# Patient Record
Sex: MALE | Race: WHITE | NOT HISPANIC OR LATINO | Employment: FULL TIME | URBAN - METROPOLITAN AREA
[De-identification: names, ages, dates, MRNs, and addresses within clinical notes are randomized per-mention and may not be internally consistent; named-entity substitution may affect disease eponyms.]

---

## 2020-12-11 ENCOUNTER — OFFICE VISIT (OUTPATIENT)
Dept: URGENT CARE | Facility: CLINIC | Age: 50
End: 2020-12-11
Payer: COMMERCIAL

## 2020-12-11 VITALS
SYSTOLIC BLOOD PRESSURE: 148 MMHG | HEIGHT: 69 IN | HEART RATE: 82 BPM | DIASTOLIC BLOOD PRESSURE: 80 MMHG | TEMPERATURE: 99.1 F | WEIGHT: 255.4 LBS | BODY MASS INDEX: 37.83 KG/M2 | RESPIRATION RATE: 16 BRPM | OXYGEN SATURATION: 98 %

## 2020-12-11 DIAGNOSIS — M25.562 ACUTE PAIN OF LEFT KNEE: Primary | ICD-10-CM

## 2020-12-11 PROCEDURE — 99213 OFFICE O/P EST LOW 20 MIN: CPT | Performed by: FAMILY MEDICINE

## 2020-12-11 RX ORDER — MELOXICAM 7.5 MG/1
7.5 TABLET ORAL DAILY
Qty: 60 TABLET | Refills: 0 | Status: SHIPPED | OUTPATIENT
Start: 2020-12-11

## 2020-12-11 RX ORDER — IBUPROFEN 200 MG
TABLET ORAL EVERY 6 HOURS PRN
COMMUNITY

## 2022-09-05 ENCOUNTER — OFFICE VISIT (OUTPATIENT)
Dept: URGENT CARE | Facility: CLINIC | Age: 52
End: 2022-09-05
Payer: COMMERCIAL

## 2022-09-05 VITALS
SYSTOLIC BLOOD PRESSURE: 148 MMHG | DIASTOLIC BLOOD PRESSURE: 80 MMHG | HEIGHT: 69 IN | OXYGEN SATURATION: 98 % | TEMPERATURE: 97.9 F | RESPIRATION RATE: 20 BRPM | BODY MASS INDEX: 38.51 KG/M2 | HEART RATE: 131 BPM | WEIGHT: 260 LBS

## 2022-09-05 DIAGNOSIS — M25.562 PAIN AND SWELLING OF LEFT KNEE: Primary | ICD-10-CM

## 2022-09-05 DIAGNOSIS — M25.462 PAIN AND SWELLING OF LEFT KNEE: Primary | ICD-10-CM

## 2022-09-05 PROCEDURE — G0382 LEV 3 HOSP TYPE B ED VISIT: HCPCS | Performed by: PHYSICIAN ASSISTANT

## 2022-09-05 PROCEDURE — S9083 URGENT CARE CENTER GLOBAL: HCPCS | Performed by: PHYSICIAN ASSISTANT

## 2022-09-05 RX ORDER — METHYLPREDNISOLONE 4 MG/1
TABLET ORAL
Qty: 21 TABLET | Refills: 0 | Status: SHIPPED | OUTPATIENT
Start: 2022-09-05

## 2022-09-05 NOTE — PATIENT INSTRUCTIONS
Swollen Knee Joint   AMBULATORY CARE:   A swollen knee joint  may be caused by arthritis or by an injury or trauma, such as a knee sprain  It may also happen if you exercise too much  It may be painful to bend or straighten your knee, or walk  Seek care immediately if:   Your knee locks or gives way and you fall  Your feet or toes start to look pale or feel cold  You cannot bear weight on your leg, or you have severe pain even after treatment  Contact your healthcare provider if:   You have a fever  You have redness or warmth over your knee  The swelling does not decrease with treatment  It gets harder or more painful to straighten your leg at the knee  Your knee weakens, or you continue to limp  You have questions or concerns about your condition or care  Treatment  depends on the cause of your swollen knee joint  Your healthcare provider may recommend any of the following:  NSAIDs , such as ibuprofen, help decrease swelling, pain, and fever  This medicine is available with or without a doctor's order  NSAIDs can cause stomach bleeding or kidney problems in certain people  If you take blood thinner medicine, always ask your healthcare provider if NSAIDs are safe for you  Always read the medicine label and follow directions  Rest your knee  Avoid activities that make the swelling or pain worse  You may need to avoid putting weight on your knee while you have pain  Crutches, a cane, or a walker can be used to avoid putting weight on your knee while it heals  Apply ice to your knee to help relieve pain and swelling  Apply ice for 15 to 20 minutes every hour or as directed  Use an ice pack, or put crushed ice in a plastic bag  Cover it with a towel before you apply it to your knee  Ice helps prevent tissue damage and decreases swelling and pain  Compress your knee with a brace or bandage to help reduce swelling  Use a brace or bandage only as directed      Elevate your knee above the level of your heart as often as you can  This will help decrease swelling and pain  Prop your joint on pillows or blankets to keep it elevated comfortably  Apply heat to your knee to relieve pain  Apply heat for 20 to 30 minutes every 2 hours for as many days as directed  Heat helps decrease pain  Go to physical therapy if directed  A physical therapist teaches you exercises to help improve movement and strength, and to decrease pain  Follow up with your doctor as directed:  Write down your questions so you remember to ask them during your visits  © Copyright [employer] 2022 Information is for End User's use only and may not be sold, redistributed or otherwise used for commercial purposes  All illustrations and images included in CareNotes® are the copyrighted property of A D A M , Inc  or [facility] [employer]   The above information is an  only  It is not intended as medical advice for individual conditions or treatments  Talk to your doctor, nurse or pharmacist before following any medical regimen to see if it is safe and effective for you

## 2022-09-15 ENCOUNTER — APPOINTMENT (OUTPATIENT)
Dept: RADIOLOGY | Facility: CLINIC | Age: 52
End: 2022-09-15
Payer: COMMERCIAL

## 2022-09-15 VITALS
BODY MASS INDEX: 34.75 KG/M2 | DIASTOLIC BLOOD PRESSURE: 78 MMHG | WEIGHT: 234.6 LBS | SYSTOLIC BLOOD PRESSURE: 136 MMHG | HEIGHT: 69 IN

## 2022-09-15 DIAGNOSIS — M25.562 LEFT KNEE PAIN, UNSPECIFIED CHRONICITY: ICD-10-CM

## 2022-09-15 DIAGNOSIS — M25.562 PAIN AND SWELLING OF LEFT KNEE: ICD-10-CM

## 2022-09-15 DIAGNOSIS — M25.562 LEFT KNEE PAIN: ICD-10-CM

## 2022-09-15 DIAGNOSIS — M25.462 PAIN AND SWELLING OF LEFT KNEE: ICD-10-CM

## 2022-09-15 DIAGNOSIS — M25.562 LEFT KNEE PAIN, UNSPECIFIED CHRONICITY: Primary | ICD-10-CM

## 2022-09-15 PROCEDURE — 99204 OFFICE O/P NEW MOD 45 MIN: CPT | Performed by: STUDENT IN AN ORGANIZED HEALTH CARE EDUCATION/TRAINING PROGRAM

## 2022-09-15 PROCEDURE — 73560 X-RAY EXAM OF KNEE 1 OR 2: CPT

## 2022-09-15 PROCEDURE — 20610 DRAIN/INJ JOINT/BURSA W/O US: CPT | Performed by: STUDENT IN AN ORGANIZED HEALTH CARE EDUCATION/TRAINING PROGRAM

## 2022-09-15 PROCEDURE — 73564 X-RAY EXAM KNEE 4 OR MORE: CPT

## 2022-09-15 RX ORDER — BUPIVACAINE HYDROCHLORIDE 2.5 MG/ML
4 INJECTION, SOLUTION INFILTRATION; PERINEURAL
Status: COMPLETED | OUTPATIENT
Start: 2022-09-15 | End: 2022-09-15

## 2022-09-15 RX ORDER — DEXAMETHASONE SODIUM PHOSPHATE 100 MG/10ML
40 INJECTION INTRAMUSCULAR; INTRAVENOUS
Status: COMPLETED | OUTPATIENT
Start: 2022-09-15 | End: 2022-09-15

## 2022-09-15 RX ADMIN — DEXAMETHASONE SODIUM PHOSPHATE 40 MG: 100 INJECTION INTRAMUSCULAR; INTRAVENOUS at 10:01

## 2022-09-15 RX ADMIN — BUPIVACAINE HYDROCHLORIDE 4 ML: 2.5 INJECTION, SOLUTION INFILTRATION; PERINEURAL at 10:01

## 2022-09-15 NOTE — PROGRESS NOTES
Ortho Sports Medicine Knee New Patient Visit      Assesment:   47 y o  male left knee pain, suspected meniscus tears    Plan:    The patient's diagnosis and treatment were discussed at length today  We discussed no treatment, non-operative treatment, and operative treatment  This time offer the patient variety of options including physical therapy, corticosteroid injection, versus MRI of the knee to further evaluate any extended meniscus tears  Primary goal at this time is returning to work  Corticosteroid injection was performed today and home exercise program was instructed  I will see the patient back in 3-4 weeks for re-evaluation  At that time of his having ongoing discomfort will obtain an MRI    Conservative treatment:    Ice to knee for 20 minutes at least 1-2 times daily  Home exercise program for ROM/strengthening to knee, hip and core  Patient denied a formal physical therapy evaluation  Tylenol for pain  Let pain guide gradual return activities  Imaging: All imaging from today was reviewed by myself and explained to the patient  Injection:    The risks and benefits of the injection (which include but are not limited to: infection, bleeding,damage to nerve/artery, need for further intervention), as well as the risks and benefits of all alternative treatments were explained and understood  The patient elected to proceed with injection  The procedure was done with aseptic technique, and the patient tolerated the procedure well with no complications  A corticosteroid injection was performed  Surgery:     No surgery is recommended at this point, continue with conservative treatment plan as noted  Follow up:          Chief Complaint   Patient presents with    Left Knee - Pain       History of Present Illness:     The patient is a 47 y o  male whose occupation is       Knee Surgical History:  None    Past Medical, Social and Family History:  No past medical history on file   No past surgical history on file  No Known Allergies  Current Outpatient Medications on File Prior to Visit   Medication Sig Dispense Refill    ibuprofen (MOTRIN) 200 mg tablet Take by mouth every 6 (six) hours as needed for mild pain      meloxicam (MOBIC) 7 5 mg tablet Take 1 tablet (7 5 mg total) by mouth daily (Patient not taking: Reported on 9/5/2022) 60 tablet 0    methylPREDNISolone 4 MG tablet therapy pack Use as directed on package 21 tablet 0     No current facility-administered medications on file prior to visit  Social History     Socioeconomic History    Marital status: Single     Spouse name: Not on file    Number of children: Not on file    Years of education: Not on file    Highest education level: Not on file   Occupational History    Not on file   Tobacco Use    Smoking status: Never Smoker    Smokeless tobacco: Never Used   Substance and Sexual Activity    Alcohol use: Yes    Drug use: Never    Sexual activity: Not Currently   Other Topics Concern    Not on file   Social History Narrative    Not on file     Social Determinants of Health     Financial Resource Strain: Not on file   Food Insecurity: Not on file   Transportation Needs: Not on file   Physical Activity: Not on file   Stress: Not on file   Social Connections: Not on file   Intimate Partner Violence: Not on file   Housing Stability: Not on file         I have reviewed the past medical, surgical, social and family history, medications and allergies as documented in the EMR  Review of systems: ROS is negative other than that noted in the HPI  Constitutional: Negative for fatigue and fever  HENT: Negative for sore throat  Respiratory: Negative for shortness of breath  Cardiovascular: Negative for chest pain  Gastrointestinal: Negative for abdominal pain  Endocrine: Negative for cold intolerance and heat intolerance  Genitourinary: Negative for flank pain     Musculoskeletal: Negative for back pain    Skin: Negative for rash  Allergic/Immunologic: Negative for immunocompromised state  Neurological: Negative for dizziness  Psychiatric/Behavioral: Negative for agitation  Physical Exam:    Blood pressure 136/78, height 5' 9" (1 753 m), weight 106 kg (234 lb 9 6 oz)  General/Constitutional: NAD, well developed, well nourished  HENT: Normocephalic, atraumatic  CV: Intact distal pulses, regular rate  Resp: No respiratory distress or labored breathing  Lymphatic: No lymphadenopathy palpated  Neuro: Alert and Oriented x 3, no focal deficits  Psych: Normal mood, normal affect, normal judgement, normal behavior  Skin: Warm, dry, no rashes, no erythema      Knee Exam (focused):  Visual inspection of the left knee demonstrates normal contour without atrophy  No previous incisions   There is no significant erythema or edema  No significant joint effusion   Range of motion is full from 5-110 degrees of flexion   Able to straight leg raise   Medial patella tender to palpation  Positive medial joint line tenderness, positive lateral joint line tenderness  Positive medial Ryne's, positive lateral Ryne's  Negative Lachman exam, negative posterior drawer  Negative dial test  Stable to varus and valgus stress at both 0 and 30°  Patella tracks normally  No J sign  No apprehension  Translation is approximately 2 quadrants and is equal to the contralateral side  Patellar eversion is similar to the contralateral side    Examination of the patient's ipsilateral hip demonstrates full painless range of motion  No crepitus  LE NV Exam: +2 DP/PT pulses bilaterally  Sensation intact to light touch L2-S1 bilaterally     Bilateral hip ROM demonstrates no pain actively or passively    No calf tenderness to palpation bilaterally    Knee Imaging    X-rays of the left knee were reviewed, which demonstrate mild degenerative changes under greatest at patellofemoral joint    Medial and lateral joint spaces relatively well maintained  There is a well rounded fabella in the posterior aspect of the knee  No acute osseous injuries  No significant joint effusion  The contralateral right knee has joint space narrowing in the medial compartment     I have reviewed the radiology report and do not currently have a radiology reading from [facility], but will check the result once the reading is performed  Large joint arthrocentesis: L knee  Universal Protocol:  Consent: Verbal consent obtained  Written consent not obtained  Risks and benefits: risks, benefits and alternatives were discussed  Consent given by: patient  Time out: Immediately prior to procedure a "time out" was called to verify the correct patient, procedure, equipment, support staff and site/side marked as required  Timeout called at: 9/15/2022 10:01 AM   Patient understanding: patient states understanding of the procedure being performed  Relevant documents: relevant documents present and verified  Test results: test results available and properly labeled  Site marked: the operative site was marked  Radiology Images displayed and confirmed   If images not available, report reviewed: imaging studies available  Patient identity confirmed: verbally with patient, provided demographic data and hospital-assigned identification number    Supporting Documentation  Indications: pain and joint swelling   Procedure Details  Location: knee - L knee  Preparation: Patient was prepped and draped in the usual sterile fashion  Needle size: 18 G  Ultrasound guidance: no  Approach: anterolateral  Medications administered: 40 mg dexamethasone 100 mg/10 mL; 4 mL bupivacaine 0 25 %    Patient tolerance: patient tolerated the procedure well with no immediate complications  Dressing:  Sterile dressing applied

## 2022-09-15 NOTE — LETTER
September 15, 2022     Patient: Oliver Toro  YOB: 1970  Date of Visit: 9/15/2022      To Whom it May Concern:    Dominik Maurice is under my professional care  Nimesh Celso was seen in my office on 9/15/2022  Nimesh Perezs may return to work on 9/26 with light duty if possible  Avoid any ladders or elevated surfaces  Limit lifting to 25 lbs   If you have any questions or concerns, please don't hesitate to call           Sincerely,          Gustavo Gerber DO        CC: No Recipients

## 2022-09-15 NOTE — LETTER
September 15, 2022     Patient: Truong Chawla  YOB: 1970  Date of Visit: 9/15/2022      To Whom it May Concern:    Radha Xiao is under my professional care  Julio C Louis was seen in my office on 9/15/2022  Bunch Heriberto may return to work with limitations of no ladders or elevated surfaces, no lifting > 25 lbs  If you have any questions or concerns, please don't hesitate to call           Sincerely,          Norma Villarreal, DO        CC: No Recipients

## 2022-09-22 NOTE — PROGRESS NOTES
[facility]        NAME: Reba Schrader is a 47 y o  male  : 1970    MRN: [id number]  DATE: 2022  TIME: 10:41 AM    Assessment and Plan   Pain and swelling of left knee [M25 562, M25 462]  1  Pain and swelling of left knee  methylPREDNISolone 4 MG tablet therapy pack    Ambulatory Referral to Orthopedic Surgery         Patient Instructions       Follow up with PCP in 3-5 days  Proceed to  ER if symptoms worsen  Chief Complaint     Chief Complaint   Patient presents with    Leg Pain     Pt reports left leg and knee pain and swelling for one week  Unsure of any known injury  History of Present Illness       48 yo M presents with left leg and knee pain with swelling that started one week ago  Pt denies any known injury to the area and denies any N/T to the extremity  Review of Systems   Review of Systems   Constitutional: Negative for chills and fever  Respiratory: Negative for chest tightness, shortness of breath and wheezing  Cardiovascular: Negative for chest pain and palpitations  Gastrointestinal: Negative for abdominal pain, constipation, diarrhea, nausea and vomiting  Musculoskeletal: Positive for gait problem, joint swelling and myalgias  Skin: Negative for rash  Neurological: Negative for dizziness, weakness, light-headedness, numbness and headaches  All other systems reviewed and are negative          Current Medications       Current Outpatient Medications:     ibuprofen (MOTRIN) 200 mg tablet, Take by mouth every 6 (six) hours as needed for mild pain, Disp: , Rfl:     methylPREDNISolone 4 MG tablet therapy pack, Use as directed on package, Disp: 21 tablet, Rfl: 0    meloxicam (MOBIC) 7 5 mg tablet, Take 1 tablet (7 5 mg total) by mouth daily (Patient not taking: Reported on 2022), Disp: 60 tablet, Rfl: 0    Current Allergies     Allergies as of 2022    (No Known Allergies)            The following portions of the patient's history were reviewed and updated as appropriate: allergies, current medications, past family history, past medical history, past social history, past surgical history and problem list      History reviewed  No pertinent past medical history  History reviewed  No pertinent surgical history  History reviewed  No pertinent family history  Medications have been verified  Objective   /80   Pulse (!) 131   Temp 97 9 °F (36 6 °C)   Resp 20   Ht 5' 9" (1 753 m)   Wt 118 kg (260 lb)   SpO2 98%   BMI 38 40 kg/m²   No LMP for male patient  Physical Exam     Physical Exam  Vitals and nursing note reviewed  Constitutional:       General: He is not in acute distress  Appearance: He is well-developed  He is not diaphoretic  HENT:      Head: Normocephalic and atraumatic  Cardiovascular:      Pulses: Normal pulses  Pulmonary:      Effort: Pulmonary effort is normal    Musculoskeletal:      Left knee: Swelling present  No erythema, ecchymosis or lacerations  Decreased range of motion  Tenderness present over the medial joint line and lateral joint line  Normal patellar mobility  Skin:     General: Skin is warm and dry  Capillary Refill: Capillary refill takes less than 2 seconds  Neurological:      Mental Status: He is alert and oriented to person, place, and time

## 2022-10-18 ENCOUNTER — HOSPITAL ENCOUNTER (EMERGENCY)
Facility: HOSPITAL | Age: 52
Discharge: HOME | End: 2022-10-18
Attending: EMERGENCY MEDICINE
Payer: COMMERCIAL

## 2022-10-18 ENCOUNTER — APPOINTMENT (EMERGENCY)
Dept: RADIOLOGY | Facility: HOSPITAL | Age: 52
End: 2022-10-18
Payer: COMMERCIAL

## 2022-10-18 VITALS
BODY MASS INDEX: 34.7 KG/M2 | HEART RATE: 93 BPM | DIASTOLIC BLOOD PRESSURE: 70 MMHG | WEIGHT: 235 LBS | OXYGEN SATURATION: 97 % | SYSTOLIC BLOOD PRESSURE: 142 MMHG | TEMPERATURE: 98.7 F | RESPIRATION RATE: 20 BRPM

## 2022-10-18 VITALS — BODY MASS INDEX: 34.66 KG/M2 | HEIGHT: 69 IN | WEIGHT: 234 LBS

## 2022-10-18 DIAGNOSIS — R79.89 ELEVATED D-DIMER: Primary | ICD-10-CM

## 2022-10-18 DIAGNOSIS — G89.29 CHRONIC PAIN OF LEFT KNEE: Primary | ICD-10-CM

## 2022-10-18 DIAGNOSIS — M25.572 LEFT ANKLE PAIN: ICD-10-CM

## 2022-10-18 DIAGNOSIS — M25.472 LEFT ANKLE SWELLING: ICD-10-CM

## 2022-10-18 DIAGNOSIS — M79.662 PAIN OF LEFT CALF: ICD-10-CM

## 2022-10-18 DIAGNOSIS — M25.562 CHRONIC PAIN OF LEFT KNEE: Primary | ICD-10-CM

## 2022-10-18 DIAGNOSIS — R65.10 SIRS (SYSTEMIC INFLAMMATORY RESPONSE SYNDROME) (HCC): ICD-10-CM

## 2022-10-18 LAB
ANION GAP SERPL CALCULATED.3IONS-SCNC: 9 MMOL/L (ref 4–13)
APTT PPP: 29 SECONDS (ref 23–37)
BASOPHILS # BLD AUTO: 0.14 THOUSANDS/ÂΜL (ref 0–0.1)
BASOPHILS NFR BLD AUTO: 1 % (ref 0–1)
BUN SERPL-MCNC: 7 MG/DL (ref 5–25)
CALCIUM SERPL-MCNC: 9.7 MG/DL (ref 8.3–10.1)
CHLORIDE SERPL-SCNC: 99 MMOL/L (ref 96–108)
CO2 SERPL-SCNC: 26 MMOL/L (ref 21–32)
CREAT SERPL-MCNC: 0.69 MG/DL (ref 0.6–1.3)
D DIMER PPP FEU-MCNC: 2.19 UG/ML FEU
EOSINOPHIL # BLD AUTO: 0.12 THOUSAND/ÂΜL (ref 0–0.61)
EOSINOPHIL NFR BLD AUTO: 1 % (ref 0–6)
ERYTHROCYTE [DISTWIDTH] IN BLOOD BY AUTOMATED COUNT: 11.6 % (ref 11.6–15.1)
GFR SERPL CREATININE-BSD FRML MDRD: 108 ML/MIN/1.73SQ M
GLUCOSE SERPL-MCNC: 118 MG/DL (ref 65–140)
HCT VFR BLD AUTO: 38.7 % (ref 36.5–49.3)
HGB BLD-MCNC: 13.5 G/DL (ref 12–17)
IMM GRANULOCYTES # BLD AUTO: 0.09 THOUSAND/UL (ref 0–0.2)
IMM GRANULOCYTES NFR BLD AUTO: 1 % (ref 0–2)
INR PPP: 1.02 (ref 0.84–1.19)
LYMPHOCYTES # BLD AUTO: 2.96 THOUSANDS/ÂΜL (ref 0.6–4.47)
LYMPHOCYTES NFR BLD AUTO: 23 % (ref 14–44)
MCH RBC QN AUTO: 32.4 PG (ref 26.8–34.3)
MCHC RBC AUTO-ENTMCNC: 34.9 G/DL (ref 31.4–37.4)
MCV RBC AUTO: 93 FL (ref 82–98)
MONOCYTES # BLD AUTO: 1.5 THOUSAND/ÂΜL (ref 0.17–1.22)
MONOCYTES NFR BLD AUTO: 11 % (ref 4–12)
NEUTROPHILS # BLD AUTO: 8.3 THOUSANDS/ÂΜL (ref 1.85–7.62)
NEUTS SEG NFR BLD AUTO: 63 % (ref 43–75)
NRBC BLD AUTO-RTO: 0 /100 WBCS
PLATELET # BLD AUTO: 285 THOUSANDS/UL (ref 149–390)
PMV BLD AUTO: 8.4 FL (ref 8.9–12.7)
POTASSIUM SERPL-SCNC: 4.2 MMOL/L (ref 3.5–5.3)
PROTHROMBIN TIME: 14.1 SECONDS (ref 11.6–14.5)
RBC # BLD AUTO: 4.17 MILLION/UL (ref 3.88–5.62)
SODIUM SERPL-SCNC: 134 MMOL/L (ref 135–147)
WBC # BLD AUTO: 13.11 THOUSAND/UL (ref 4.31–10.16)

## 2022-10-18 PROCEDURE — 99284 EMERGENCY DEPT VISIT MOD MDM: CPT

## 2022-10-18 PROCEDURE — 80048 BASIC METABOLIC PNL TOTAL CA: CPT

## 2022-10-18 PROCEDURE — 85610 PROTHROMBIN TIME: CPT

## 2022-10-18 PROCEDURE — 99213 OFFICE O/P EST LOW 20 MIN: CPT | Performed by: STUDENT IN AN ORGANIZED HEALTH CARE EDUCATION/TRAINING PROGRAM

## 2022-10-18 PROCEDURE — 96372 THER/PROPH/DIAG INJ SC/IM: CPT

## 2022-10-18 PROCEDURE — 36415 COLL VENOUS BLD VENIPUNCTURE: CPT

## 2022-10-18 PROCEDURE — 85025 COMPLETE CBC W/AUTO DIFF WBC: CPT

## 2022-10-18 PROCEDURE — 85379 FIBRIN DEGRADATION QUANT: CPT

## 2022-10-18 PROCEDURE — 73610 X-RAY EXAM OF ANKLE: CPT

## 2022-10-18 PROCEDURE — 85730 THROMBOPLASTIN TIME PARTIAL: CPT

## 2022-10-18 RX ORDER — ENOXAPARIN SODIUM 100 MG/ML
30 INJECTION SUBCUTANEOUS ONCE
Status: COMPLETED | OUTPATIENT
Start: 2022-10-18 | End: 2022-10-18

## 2022-10-18 RX ADMIN — ENOXAPARIN SODIUM 30 MG: 100 INJECTION SUBCUTANEOUS at 23:30

## 2022-10-18 NOTE — PROGRESS NOTES
Ortho Sports Medicine Knee Follow Up Visit     Assesment:     47 y o  male left knee suspected meniscus tear, generalized asymmetric left lower extremity edema concerning for DVT    Plan:    The patient's diagnosis and treatment were discussed at length today  We discussed no treatment, non-operative treatment, and operative treatment  Explained that due to his atraumatic onset left lower extremity edema, pain in the posterior aspect of the calf and thigh I am concerned for a DVT  I will send him over to the [place] [address] for stat duplex left lower extremity  If his ultrasound is negative, I advised continued activity modification with rest, elevation, and ice to the left lower extremity  I do feel as internal derangement within the knee, however this is obviously last urgent  We can obtain an MRI within the next several weeks to evaluate for a meniscal tears within the knee  He reports he has continued to work and spends a significant amount of time on his feet, standing, and laboring  He feels is not helping his symptoms, however he is unable to take significant time away from work at the moment  Conservative treatment:    Ice to knee for 20 minutes at least 1-2 times daily  OTC NSAIDS prn for pain  Advised patient to go to [place] [address] ED for workup of possible DVT due to concerning left calf pain and swelling seen on physical exam    Once we clear the patient of potential DVT, we can proceed with MRI of left knee  An MRI will give us a better understanding of any potential internal derangement of the left knee  Imaging: All imaging from today was reviewed by myself and explained to the patient  No imaging was available for review today  Injection:    No Injection planned at this time  Surgery:     No surgery is recommended at this point, continue with conservative treatment plan as noted  Follow up:    No follow-ups on file          Chief Complaint   Patient presents with • Left Knee - Follow-up       History of Present Illness:    Ronel Skelton is a 79-year-old male returns today for evaluation of left knee pain  Since his last visit, he reports some improvement in his left knee pain following the injection  He states injection resolved nearly all of his pain is able to participate in work for 2 weeks time without significant difficulties  He states that the injection has since wore off, and he has ongoing pain both the medial and lateral aspects of the knee  Of note, he reports atraumatic onset of generalized swelling and edema of the left lower leg over the last 1-2 weeks  He does report a fall over the weekend, however his swelling in the leg proceeded this fall  He now has significant bruising over the lower leg in a ring-like pattern around the ankle  He attributes this to wearing his work boots in a excessively tight fashion  He presents seeking diagnosis and treatment recommendations  Knee Surgical History:  None    Past Medical, Social and Family History:  No past medical history on file  No past surgical history on file  No Known Allergies  Current Outpatient Medications on File Prior to Visit   Medication Sig Dispense Refill   • ibuprofen (MOTRIN) 200 mg tablet Take by mouth every 6 (six) hours as needed for mild pain     • meloxicam (MOBIC) 7 5 mg tablet Take 1 tablet (7 5 mg total) by mouth daily (Patient not taking: Reported on 9/5/2022) 60 tablet 0   • methylPREDNISolone 4 MG tablet therapy pack Use as directed on package 21 tablet 0     No current facility-administered medications on file prior to visit       Social History     Socioeconomic History   • Marital status: Single     Spouse name: Not on file   • Number of children: Not on file   • Years of education: Not on file   • Highest education level: Not on file   Occupational History   • Not on file   Tobacco Use   • Smoking status: Never Smoker   • Smokeless tobacco: Never Used   Substance and Sexual Activity   • Alcohol use: Yes   • Drug use: Never   • Sexual activity: Not Currently   Other Topics Concern   • Not on file   Social History Narrative   • Not on file     Social Determinants of Health     Financial Resource Strain: Not on file   Food Insecurity: Not on file   Transportation Needs: Not on file   Physical Activity: Not on file   Stress: Not on file   Social Connections: Not on file   Intimate Partner Violence: Not on file   Housing Stability: Not on file         I have reviewed the past medical, surgical, social and family history, medications and allergies as documented in the EMR  Review of systems: ROS is negative other than that noted in the HPI  Constitutional: Negative for fatigue and fever  Physical Exam:    Height 5' 9" (1 753 m), weight 106 kg (234 lb)  General/Constitutional: NAD, well developed, well nourished  HENT: Normocephalic, atraumatic  CV: Intact distal pulses, regular rate  Resp: No respiratory distress or labored breathing  Lymphatic: No lymphadenopathy palpated  Neuro: Alert and Oriented x 3, no focal deficits  Psych: Normal mood, normal affect, normal judgement, normal behavior  Skin: Warm, dry, no rashes, no erythema      Knee Exam (focused):  Visual inspection of the left leg demonstrates generalized edema from the knee to the ankle with significant edema at the mid tibia and ankle region  Edema does not appear to be pending in nature  There is a bandlike area of ecchymosis and erythema across the ankle  This is mildly blanching  No open wounds or drainage    Mild joint effusion  Range of motion is limited from 5-110 degrees of flexion   Able to straight leg raise   Medial patella tender to palpation  Positive medial joint line tenderness, positive lateral joint line tenderness  Positive medial Ryne's, positive lateral Ryne's  Negative Lachman exam, negative posterior drawer  Negative dial test  Stable to varus and valgus stress at both 0 and 30°  Patella tracks normally  No J sign  No apprehension  Translation is approximately 2 quadrants and is equal to the contralateral side  Patellar eversion is similar to the contralateral side     Examination of the patient's ipsilateral hip demonstrates full painless range of motion  No crepitus  Left Calf:   Erythematous band of distal calf seen  Tender to palpation of generalized left calf  Left calf and ankle extremely swollen    Positive Homans test       LE NV Exam: +2 DP/PT pulses bilaterally  Sensation intact to light touch L2-S1 bilaterally    No calf tenderness to palpation bilaterally      Knee Imaging    No imaging was performed today      Scribe Attestation    I,:   am acting as a scribe while in the presence of the attending physician :       I,:   personally performed the services described in this documentation    as scribed in my presence :

## 2022-10-18 NOTE — LETTER
October 18, 2022     Patient: Laura Moeller  YOB: 1970  Date of Visit: 10/18/2022      To Whom it May Concern:    Yosi Bangfrancesca is under my professional care  Aaron Farah was seen in my office on 10/18/2022  Aaron Farah should limit the amount of time that he is on his feet  He should avoid going on ladders  If you have any questions or concerns, please don't hesitate to call  Sincerely,          Freedom Etienne DO        CC: Gloria Estrada

## 2022-10-18 NOTE — LETTER
October 18, 2022     Patient: Akash Rowe  YOB: 1970  Date of Visit: 10/18/2022      To Whom it May Concern:    Oumou Corado is under my professional care  Jacqueline Campuzano was seen in my office on 10/18/2022  Jacqueline Campuzano should limit the amount of time that he is standing at work  If you have any questions or concerns, please don't hesitate to call  Sincerely,          Katie Muñoz DO        CC: [employer]

## 2022-10-19 ENCOUNTER — TELEPHONE (OUTPATIENT)
Dept: OBGYN CLINIC | Facility: MEDICAL CENTER | Age: 52
End: 2022-10-19

## 2022-10-19 ENCOUNTER — HOSPITAL ENCOUNTER (OUTPATIENT)
Dept: NON INVASIVE DIAGNOSTICS | Facility: HOSPITAL | Age: 52
Discharge: HOME | End: 2022-10-19
Payer: COMMERCIAL

## 2022-10-19 DIAGNOSIS — M79.662 PAIN OF LEFT CALF: ICD-10-CM

## 2022-10-19 DIAGNOSIS — L03.116 CELLULITIS OF LEFT LOWER EXTREMITY: Primary | ICD-10-CM

## 2022-10-19 PROCEDURE — 93971 EXTREMITY STUDY: CPT

## 2022-10-19 PROCEDURE — 93971 EXTREMITY STUDY: CPT | Performed by: SURGERY

## 2022-10-19 RX ORDER — CEPHALEXIN 500 MG/1
500 CAPSULE ORAL EVERY 6 HOURS SCHEDULED
Qty: 28 CAPSULE | Refills: 0 | Status: SHIPPED | OUTPATIENT
Start: 2022-10-19 | End: 2022-10-26

## 2022-10-19 NOTE — TELEPHONE ENCOUNTER
Caller: Patient    Doctor: Keturah Brittle     Reason for call:     Patient calling to let the doctor know that he is scheduled for the [facility] [[id number]] for 10/19/2022 at 4:45 pm today      Call back#: [phone]

## 2022-10-19 NOTE — ED PROVIDER NOTES
History  Chief Complaint   Patient presents with   • Leg Pain     Pt sent by Dr Padilla Lopez for left ankle swelling x2 weeks, noticed circumferential bruising above the ankle yesterday  48 y/o male with PMH meniscal tear presents to the ER today for left ankle swelling x two weeks  Patient states that the swelling started out of nowhere and surrounds his entire ankle  He states yesterday he started with redness around the ankle  Says the pain is a 4/10 and radiates up into his calf  He does admit to falling on the ankle a couple of days ago down the stairs as well  He denies any  Chest pain, shortness of breath, fevers, chills, abdominal pain, vomiting,numbness, tingling, weakness purulent discharge from the area  States he has been having pain with walking on it so he has been using crutches  Has been taking ibuprofen and or tylenol at home with minimal relief  He denies recent hospitalizations, surgeries, travel in plane or long car rides, cancer diagnosis, previous PE/DVT  History provided by:  Patient   used: No    Leg Pain  Pain details:     Quality:  Dull    Severity:  Moderate    Onset quality:  Gradual    Duration:  2 weeks    Timing:  Constant    Progression:  Waxing and waning  Chronicity:  New  Ineffective treatments:  Acetaminophen and NSAIDs  Associated symptoms: decreased ROM and swelling    Associated symptoms: no fever, no numbness and no tingling        Prior to Admission Medications   Prescriptions Last Dose Informant Patient Reported? Taking?   ibuprofen (MOTRIN) 200 mg tablet   Yes No   Sig: Take by mouth every 6 (six) hours as needed for mild pain   meloxicam (MOBIC) 7 5 mg tablet   No No   Sig: Take 1 tablet (7 5 mg total) by mouth daily   Patient not taking: Reported on 9/5/2022   methylPREDNISolone 4 MG tablet therapy pack   No No   Sig: Use as directed on package      Facility-Administered Medications: None       History reviewed   No pertinent past medical history  History reviewed  No pertinent surgical history  History reviewed  No pertinent family history  I have reviewed and agree with the history as documented  E-Cigarette/Vaping     E-Cigarette/Vaping Substances     Social History     Tobacco Use   • Smoking status: Never Smoker   • Smokeless tobacco: Never Used   Substance Use Topics   • Alcohol use: Yes   • Drug use: Never       Review of Systems   Constitutional: Negative for chills and fever  Respiratory: Negative for chest tightness and shortness of breath  Cardiovascular: Positive for leg swelling  Negative for chest pain and palpitations  Gastrointestinal: Negative for abdominal pain, nausea and vomiting  Musculoskeletal: Positive for arthralgias and joint swelling  Skin: Negative for color change  Neurological: Negative for weakness, numbness and headaches  Psychiatric/Behavioral: Negative for behavioral problems and sleep disturbance  All other systems reviewed and are negative  Physical Exam  Physical Exam  Vitals and nursing note reviewed  Constitutional:       General: He is awake  Appearance: Normal appearance  He is well-developed  HENT:      Head: Normocephalic and atraumatic  Right Ear: External ear normal       Left Ear: External ear normal       Nose: Nose normal    Eyes:      General: No scleral icterus  Extraocular Movements: Extraocular movements intact  Cardiovascular:      Rate and Rhythm: Normal rate and regular rhythm  Heart sounds: Normal heart sounds, S1 normal and S2 normal  No murmur heard  No gallop  Pulmonary:      Effort: Pulmonary effort is normal       Breath sounds: Normal breath sounds  No wheezing, rhonchi or rales  Musculoskeletal:         General: Normal range of motion  Cervical back: Normal range of motion  Comments: Patient is tender to palpation of the medial and lateral malleoli   There is associated swelling around the ankle joint with erythema noted above the ankle on the calf  Patient also tender to palpation of the distal lateral calf  Decreased flexion ability, full strength noted and otherwise ROM normal  Area is not warm to the touch, no crepitus noted  Soft compartments  NV and sensation intact  DP and PT pulses +2  Cap refill 2 sec  See photo attached below   Skin:     General: Skin is warm and dry  Findings: Erythema present  Comments: Erythema noted above the ankle circumferentially  See photos   Neurological:      General: No focal deficit present  Mental Status: He is alert  Psychiatric:         Attention and Perception: Attention and perception normal          Mood and Affect: Mood normal          Behavior: Behavior normal  Behavior is cooperative  Vital Signs  ED Triage Vitals [10/18/22 1848]   Temperature Pulse Respirations Blood Pressure SpO2   98 7 °F (37 1 °C) 105 18 (!) 172/101 95 %      Temp Source Heart Rate Source Patient Position - Orthostatic VS BP Location FiO2 (%)   Oral Monitor Sitting Left arm --      Pain Score       --           Vitals:    10/18/22 1848 10/18/22 2057 10/18/22 2209 10/18/22 2300   BP: (!) 172/101 145/73 132/73 142/70   Pulse: 105 98 98 93   Patient Position - Orthostatic VS: Sitting  Lying          Visual Acuity      ED Medications  Medications   enoxaparin (LOVENOX) subcutaneous injection 30 mg (30 mg Subcutaneous Given 10/18/22 2330)       Diagnostic Studies  Results Reviewed     Procedure Component Value Units Date/Time    D-Dimer [[id number]]  (Abnormal) Collected: 10/18/22 2208    Lab Status: Final result Specimen: Blood from Arm, Left Updated: 10/18/22 2245     D-Dimer, Quant 2 19 ug/ml FEU     Narrative:       In the evaluation for possible pulmonary embolism, in the appropriate (Well's Score of 4 or less) patient, the age adjusted d-dimer cutoff for this patient can be calculated as:    Age x 0 01 (in ug/mL) for Age-adjusted D-dimer exclusion threshold for a patient over 50 years      Protime-INR [[id number]]  (Normal) Collected: 10/18/22 2208    Lab Status: Final result Specimen: Blood from Arm, Left Updated: 10/18/22 2230     Protime 14 1 seconds      INR 1 02    APTT [[id number]]  (Normal) Collected: 10/18/22 2208    Lab Status: Final result Specimen: Blood from Arm, Left Updated: 10/18/22 2230     PTT 29 seconds     Basic metabolic panel [[id number]]  (Abnormal) Collected: 10/18/22 2208    Lab Status: Final result Specimen: Blood from Arm, Left Updated: 10/18/22 2225     Sodium 134 mmol/L      Potassium 4 2 mmol/L      Chloride 99 mmol/L      CO2 26 mmol/L      ANION GAP 9 mmol/L      BUN 7 mg/dL      Creatinine 0 69 mg/dL      Glucose 118 mg/dL      Calcium 9 7 mg/dL      eGFR 108 ml/min/1 73sq m     Narrative:      [place] guidelines for Chronic Kidney Disease (CKD):   •  Stage 1 with normal or high GFR (GFR > 90 mL/min/1 73 square meters)  •  Stage 2 Mild CKD (GFR = 60-89 mL/min/1 73 square meters)  •  Stage 3A Moderate CKD (GFR = 45-59 mL/min/1 73 square meters)  •  Stage 3B Moderate CKD (GFR = 30-44 mL/min/1 73 square meters)  •  Stage 4 Severe CKD (GFR = 15-29 mL/min/1 73 square meters)  •  Stage 5 End Stage CKD (GFR <15 mL/min/1 73 square meters)  Note: GFR calculation is accurate only with a steady state creatinine    CBC and differential [[id number]]  (Abnormal) Collected: 10/18/22 2208    Lab Status: Final result Specimen: Blood from Arm, Left Updated: 10/18/22 2217     WBC 13 11 Thousand/uL      RBC 4 17 Million/uL      Hemoglobin 13 5 g/dL      Hematocrit 38 7 %      MCV 93 fL      MCH 32 4 pg      MCHC 34 9 g/dL      RDW 11 6 %      MPV 8 4 fL      Platelets 021 Thousands/uL      nRBC 0 /100 WBCs      Neutrophils Relative 63 %      Immat GRANS % 1 %      Lymphocytes Relative 23 %      Monocytes Relative 11 %      Eosinophils Relative 1 %      Basophils Relative 1 %      Neutrophils Absolute 8 30 Thousands/µL      Immature Grans Absolute 0 09 Thousand/uL      Lymphocytes Absolute 2 96 Thousands/µL      Monocytes Absolute 1 50 Thousand/µL      Eosinophils Absolute 0 12 Thousand/µL      Basophils Absolute 0 14 Thousands/µL                  XR ankle 3+ views LEFT    (Results Pending)              Procedures  Procedures         ED Course  ED Course as of 10/19/22 0110   Tue Oct 18, 2022   2233 Discussed case with Dr Davon Avelar, will wait to see what the D-dimer is and if negative can treat for cellulitis  Septic workup not required at this time  If d-dimer elevated will treat for DVT and order DVT study for the morning    2326 D-dimer elevated so will treat with lovenox and inform patient he needs to get the DVT study in the morning    2327 Elevated WBC, tachycardia likely due to DVT so sepsis workup not indicated                            Initial Sepsis Screening     Row Name 10/18/22 2326 10/18/22 2227             Is the patient's history suggestive of a new or worsening infection? No  -LM --       Suspected source of infection -- --       Are two or more of the following signs & symptoms of infection both present and new to the patient? Yes (Proceed)  -LM --       Indicate SIRS criteria Leukocytosis (WBC > 53401 IJL); Tachycardia > 90 bpm  -LM Tachycardia > 90 bpm;Leukocytosis (WBC > 36555 IJL)  -LM       If the answer is yes to both questions, suspicion of sepsis is present -- --       If severe sepsis is present AND tissue hypoperfusion perists in the hour after fluid resuscitation or lactate > 4, the patient meets criteria for SEPTIC SHOCK -- --       Are any of the following organ dysfunction criteria present within 6 hours of suspected infection and SIRS criteria that are NOT considered to be chronic conditions? -- --       Organ dysfunction -- --       Date of presentation of severe sepsis -- --       Time of presentation of severe sepsis -- --       Tissue hypoperfusion persists in the hour after crystalloid fluid administration, evidenced, by either: -- --       Was hypotension present within one hour of the conclusion of crystalloid fluid administration? -- --       Date of presentation of septic shock -- --       Time of presentation of septic shock -- --             User Key  (r) = Recorded By, (t) = Taken By, (c) = Cosigned By    [address] Name Provider Type    GIFTY Carl PA-C Physician Assistant                SBIRT 22yo+    Flowsheet Row Most Recent Value   SBIRT (23 yo +)    In order to provide better care to our patients, we are screening all of our patients for alcohol and drug use  Would it be okay to ask you these screening questions? Yes Filed at: 10/18/2022 2211   Initial Alcohol Screen: US AUDIT-C     1  How often do you have a drink containing alcohol? 6 Filed at: 10/18/2022 2211   2  How many drinks containing alcohol do you have on a typical day you are drinking? 6 Filed at: 10/18/2022 2211   3a  Male UNDER 65: How often do you have five or more drinks on one occasion? 6 Filed at: 10/18/2022 2211   Audit-C Score 18 Filed at: 10/18/2022 2211   Full Alcohol Screen: US AUDIT    4  How often during the last year have you found that you were not able to stop drinking once you had started? 0 Filed at: 10/18/2022 2211   5  How often during past year have you failed to do what was normally expected of you because of drinking? 0 Filed at: 10/18/2022 2211   6  How often in past year have you needed a first drink in the morning to get yourself going after a heavy drinking session? 0 Filed at: 10/18/2022 2211   7  How often in past year have you had feeling of guilt or remorse after drinking? 0 Filed at: 10/18/2022 2211   8  How often in past year have you been unable to remember what happened night before because you had been drinking? 0 Filed at: 10/18/2022 2211   9  Have you or someone else been injured as a result of your drinking? 0 Filed at: 10/18/2022 2211   10   Has a relative, friend, doctor or other health worker been concerned about your drinking and suggested you cut down?  0 Filed at: 10/18/2022 2211   AUDIT Total Score 18 Filed at: 10/18/2022 2211   JOAN: How many times in the past year have you    Used an illegal drug or used a prescription medication for non-medical reasons? Never Filed at: 10/18/2022 2211            Marcelino' Criteria for DVT    Flowsheet Row Most Recent Value   Marcelino' Criteria for DVT    Active cancer Treatment or palliation within 6 months 0 Filed at: 10/18/2022 2145   Bedridden recently >3 days or major surgery within 12 weeks 0 Filed at: 10/18/2022 2145   Calf swelling >3 cm compared to the other leg 0 Filed at: 10/18/2022 2145   Entire leg swollen 0 Filed at: 10/18/2022 2145   Collateral (nonvaricose) superficial veins present 0 Filed at: 10/18/2022 2145   Localized tenderness along the deep venous system 0 Filed at: 10/18/2022 2145   Pitting edema, confined to symptomatic leg 1 Filed at: 10/18/2022 2145   Paralysis, paresis, or recent plaster immobilization of the lower extremity 0 Filed at: 10/18/2022 2145   Previously documented DVT 0 Filed at: 10/18/2022 2145   Alternative diagnosis to DVT as likely or more likely 0 Filed at: 10/18/2022 2145   Marcelino DVT Critera Score 1 Filed at: 10/18/2022 2145              MDM  Number of Diagnoses or Management Options  Elevated d-dimer: new and requires workup  Left ankle pain: new and requires workup  Left ankle swelling: new and requires workup  SIRS (systemic inflammatory response syndrome) ([facility] [address] ): new and does not require workup  Diagnosis management comments: 46 y/o male sent by his orthopedic doctor for left ankle swelling x 2 weeks and redness started yesterday  Differential diagnosis: DVT, ankle fracture, cellulitis, septic arthritis, osteoarthritis  Assessment[de-identified] elevated D-dimer with left ankle pain and swelling  Plan: patient met SIRS criteria due to tachycardia and elevated WBC but cause not likely to be infectious, likely DVT due to elevated d-dimer  Patient started on lovenox and told to schedule DVT study tomorrow morning with order from orthopedic doctor  Patient was informed that the medication would only last him until tomorrow so he should call his PCP for follow-up after the DVT study  Patient was given info and care instructions about DVTs  He  was given strict return to ER precautions both verbally and in discharge papers  Patient verbalized understanding and agrees with plan  Amount and/or Complexity of Data Reviewed  Clinical lab tests: ordered and reviewed  Tests in the radiology section of CPT®: ordered and reviewed    Risk of Complications, Morbidity, and/or Mortality  Presenting problems: moderate  Diagnostic procedures: low  Management options: moderate    Patient Progress  Patient progress: stable      Disposition  Final diagnoses:   Elevated d-dimer   SIRS (systemic inflammatory response syndrome) ([facility] )   Left ankle pain   Left ankle swelling     Time reflects when diagnosis was documented in both MDM as applicable and the Disposition within this note     Time User Action Codes Description Comment    10/18/2022 11:26 PM Twila Gauze Add [R79 89] Elevated d-dimer     10/18/2022 11:27 PM Twila Gauze Add [R65 10] SIRS (systemic inflammatory response syndrome) ([facility] )     10/18/2022 11:27 PM Twila Gauze Add [M25 572] Left ankle pain     10/18/2022 11:27 PM Twila Gauze Add [M25 472] Left ankle swelling       ED Disposition     ED Disposition   Discharge    Condition   Stable    Date/Time   Tue Oct 18, 2022 11:26 PM    Comment   [place] LENZ [facility] discharge to home/self care                 Follow-up Information     Follow up With Specialties Details Why Contact Info Additional Information    Latha Oropeza MD Family Medicine Schedule an appointment as soon as possible for a visit   [place]  [address]  [address] [address] [address] [facility],[address]        [address] Emergency Department Emergency Medicine Go to  if you develop intense pain in your foot or calf that is worse or different than before, redness spreads, develop fevers, chest pain, shortness of breath, cough up blood, difficulty breathing [address] [facility] Emergency Department, [address] [place], [address]          Discharge Medication List as of 10/18/2022 11:29 PM      CONTINUE these medications which have NOT CHANGED    Details   ibuprofen (MOTRIN) 200 mg tablet Take by mouth every 6 (six) hours as needed for mild pain, Historical Med      meloxicam (MOBIC) 7 5 mg tablet Take 1 tablet (7 5 mg total) by mouth daily, Starting Fri 12/11/2020, Normal      methylPREDNISolone 4 MG tablet therapy pack Use as directed on package, Normal             No discharge procedures on file      PDMP Review     None          ED Provider  Electronically Signed by           Elizabeth Hatfield PA-C  10/19/22 0113

## 2022-10-19 NOTE — DISCHARGE INSTRUCTIONS
Call the central scheduling number on the order for DVT study from the orthopedic doctor to schedule the DVT study for tomorrow  The medication with cover you only until tomorrow so make sure you get the study done     Schedule an appointment with your primary care doctor for follow up evaluation   Return to the ER if you develop intense pain in your foot or calf that is worse or different than before, redness spreads, develop fevers, chest pain, shortness of breath, cough up blood, difficulty breathing

## 2022-10-20 ENCOUNTER — RA CDI HCC (OUTPATIENT)
Dept: OTHER | Facility: HOSPITAL | Age: 52
End: 2022-10-20

## 2022-10-20 NOTE — PROGRESS NOTES
[facility]  coding opportunities       Chart reviewed, no opportunity found: CHART REVIEWED, NO OPPORTUNITY FOUND     Patients Insurance     Commercial Insurance: [address] [place] [address]

## 2022-11-05 ENCOUNTER — HOSPITAL ENCOUNTER (OUTPATIENT)
Dept: MRI IMAGING | Facility: HOSPITAL | Age: 52
Discharge: HOME | End: 2022-11-05

## 2022-11-05 DIAGNOSIS — G89.29 CHRONIC PAIN OF LEFT KNEE: ICD-10-CM

## 2022-11-05 DIAGNOSIS — M25.562 CHRONIC PAIN OF LEFT KNEE: ICD-10-CM

## 2025-04-25 ENCOUNTER — APPOINTMENT (EMERGENCY)
Dept: RADIOLOGY | Facility: HOSPITAL | Age: 55
End: 2025-04-25
Payer: COMMERCIAL

## 2025-04-25 ENCOUNTER — OFFICE VISIT (OUTPATIENT)
Dept: URGENT CARE | Facility: CLINIC | Age: 55
End: 2025-04-25
Payer: COMMERCIAL

## 2025-04-25 ENCOUNTER — HOSPITAL ENCOUNTER (EMERGENCY)
Facility: HOSPITAL | Age: 55
Discharge: HOME | End: 2025-04-25
Attending: EMERGENCY MEDICINE
Payer: COMMERCIAL

## 2025-04-25 VITALS
SYSTOLIC BLOOD PRESSURE: 107 MMHG | HEIGHT: 69 IN | RESPIRATION RATE: 20 BRPM | WEIGHT: 245 LBS | TEMPERATURE: 98.7 F | DIASTOLIC BLOOD PRESSURE: 76 MMHG | OXYGEN SATURATION: 95 % | HEART RATE: 80 BPM | BODY MASS INDEX: 36.29 KG/M2

## 2025-04-25 VITALS — HEART RATE: 145 BPM | OXYGEN SATURATION: 98 % | RESPIRATION RATE: 24 BRPM | TEMPERATURE: 102 F

## 2025-04-25 DIAGNOSIS — I83.812 VARICOSE VEINS OF LEFT LOWER EXTREMITY WITH PAIN: ICD-10-CM

## 2025-04-25 DIAGNOSIS — M79.89 LEG SWELLING: Primary | ICD-10-CM

## 2025-04-25 DIAGNOSIS — L03.119 CELLULITIS OF FOOT: ICD-10-CM

## 2025-04-25 DIAGNOSIS — S83.90XA KNEE SPRAIN: ICD-10-CM

## 2025-04-25 DIAGNOSIS — M79.662 PAIN OF LEFT CALF: Primary | ICD-10-CM

## 2025-04-25 DIAGNOSIS — M25.562 ACUTE PAIN OF LEFT KNEE: ICD-10-CM

## 2025-04-25 LAB
ALBUMIN SERPL BCG-MCNC: 4.3 G/DL (ref 3.5–5)
ALP SERPL-CCNC: 96 U/L (ref 34–104)
ALT SERPL W P-5'-P-CCNC: 49 U/L (ref 7–52)
ANION GAP SERPL CALCULATED.3IONS-SCNC: 13 MMOL/L (ref 4–13)
AST SERPL W P-5'-P-CCNC: 65 U/L (ref 13–39)
ATRIAL RATE: 97 BPM
BASOPHILS # BLD AUTO: 0.12 THOUSANDS/ÂΜL (ref 0–0.1)
BASOPHILS NFR BLD AUTO: 1 % (ref 0–1)
BILIRUB SERPL-MCNC: 0.58 MG/DL (ref 0.2–1)
BUN SERPL-MCNC: 10 MG/DL (ref 5–25)
CALCIUM SERPL-MCNC: 9.9 MG/DL (ref 8.4–10.2)
CHLORIDE SERPL-SCNC: 98 MMOL/L (ref 96–108)
CO2 SERPL-SCNC: 21 MMOL/L (ref 21–32)
CREAT SERPL-MCNC: 0.69 MG/DL (ref 0.6–1.3)
D DIMER PPP FEU-MCNC: 1.09 UG/ML FEU
EOSINOPHIL # BLD AUTO: 0.23 THOUSAND/ÂΜL (ref 0–0.61)
EOSINOPHIL NFR BLD AUTO: 2 % (ref 0–6)
ERYTHROCYTE [DISTWIDTH] IN BLOOD BY AUTOMATED COUNT: 11.1 % (ref 11.6–15.1)
GFR SERPL CREATININE-BSD FRML MDRD: 106 ML/MIN/1.73SQ M
GLUCOSE SERPL-MCNC: 126 MG/DL (ref 65–140)
HCT VFR BLD AUTO: 45 % (ref 36.5–49.3)
HGB BLD-MCNC: 15.4 G/DL (ref 12–17)
IMM GRANULOCYTES # BLD AUTO: 0.12 THOUSAND/UL (ref 0–0.2)
IMM GRANULOCYTES NFR BLD AUTO: 1 % (ref 0–2)
LYMPHOCYTES # BLD AUTO: 3.58 THOUSANDS/ÂΜL (ref 0.6–4.47)
LYMPHOCYTES NFR BLD AUTO: 26 % (ref 14–44)
MCH RBC QN AUTO: 31.6 PG (ref 26.8–34.3)
MCHC RBC AUTO-ENTMCNC: 34.2 G/DL (ref 31.4–37.4)
MCV RBC AUTO: 92 FL (ref 82–98)
MONOCYTES # BLD AUTO: 1.51 THOUSAND/ÂΜL (ref 0.17–1.22)
MONOCYTES NFR BLD AUTO: 11 % (ref 4–12)
NEUTROPHILS # BLD AUTO: 8.09 THOUSANDS/ÂΜL (ref 1.85–7.62)
NEUTS SEG NFR BLD AUTO: 59 % (ref 43–75)
NRBC BLD AUTO-RTO: 0 /100 WBCS
P AXIS: 50 DEGREES
PLATELET # BLD AUTO: 311 THOUSANDS/UL (ref 149–390)
PMV BLD AUTO: 8.5 FL (ref 8.9–12.7)
POTASSIUM SERPL-SCNC: 3.8 MMOL/L (ref 3.5–5.3)
PR INTERVAL: 190 MS
PROT SERPL-MCNC: 9.2 G/DL (ref 6.4–8.4)
QRS AXIS: -16 DEGREES
QRSD INTERVAL: 88 MS
QT INTERVAL: 342 MS
QTC INTERVAL: 434 MS
RBC # BLD AUTO: 4.87 MILLION/UL (ref 3.88–5.62)
SODIUM SERPL-SCNC: 132 MMOL/L (ref 135–147)
T WAVE AXIS: 27 DEGREES
VENTRICULAR RATE: 97 BPM
WBC # BLD AUTO: 13.65 THOUSAND/UL (ref 4.31–10.16)

## 2025-04-25 PROCEDURE — 96372 THER/PROPH/DIAG INJ SC/IM: CPT

## 2025-04-25 PROCEDURE — 73630 X-RAY EXAM OF FOOT: CPT

## 2025-04-25 PROCEDURE — 85379 FIBRIN DEGRADATION QUANT: CPT

## 2025-04-25 PROCEDURE — S9083 URGENT CARE CENTER GLOBAL: HCPCS | Performed by: FAMILY MEDICINE

## 2025-04-25 PROCEDURE — 93005 ELECTROCARDIOGRAM TRACING: CPT

## 2025-04-25 PROCEDURE — 96365 THER/PROPH/DIAG IV INF INIT: CPT

## 2025-04-25 PROCEDURE — 73564 X-RAY EXAM KNEE 4 OR MORE: CPT

## 2025-04-25 PROCEDURE — 36415 COLL VENOUS BLD VENIPUNCTURE: CPT

## 2025-04-25 PROCEDURE — 96375 TX/PRO/DX INJ NEW DRUG ADDON: CPT

## 2025-04-25 PROCEDURE — 99283 EMERGENCY DEPT VISIT LOW MDM: CPT

## 2025-04-25 PROCEDURE — 99285 EMERGENCY DEPT VISIT HI MDM: CPT | Performed by: EMERGENCY MEDICINE

## 2025-04-25 PROCEDURE — 96361 HYDRATE IV INFUSION ADD-ON: CPT

## 2025-04-25 PROCEDURE — 85025 COMPLETE CBC W/AUTO DIFF WBC: CPT

## 2025-04-25 PROCEDURE — 80053 COMPREHEN METABOLIC PANEL: CPT

## 2025-04-25 PROCEDURE — G0382 LEV 3 HOSP TYPE B ED VISIT: HCPCS | Performed by: FAMILY MEDICINE

## 2025-04-25 RX ORDER — SULFAMETHOXAZOLE AND TRIMETHOPRIM 800; 160 MG/1; MG/1
1 TABLET ORAL 2 TIMES DAILY
Qty: 14 TABLET | Refills: 0 | Status: SHIPPED | OUTPATIENT
Start: 2025-04-25 | End: 2025-05-02

## 2025-04-25 RX ORDER — ENOXAPARIN SODIUM 150 MG/ML
1 INJECTION SUBCUTANEOUS ONCE
Status: COMPLETED | OUTPATIENT
Start: 2025-04-25 | End: 2025-04-25

## 2025-04-25 RX ORDER — KETOROLAC TROMETHAMINE 30 MG/ML
15 INJECTION, SOLUTION INTRAMUSCULAR; INTRAVENOUS ONCE
Status: COMPLETED | OUTPATIENT
Start: 2025-04-25 | End: 2025-04-25

## 2025-04-25 RX ORDER — ACETAMINOPHEN 325 MG/1
975 TABLET ORAL ONCE
Status: COMPLETED | OUTPATIENT
Start: 2025-04-25 | End: 2025-04-25

## 2025-04-25 RX ORDER — CEFTRIAXONE 1 G/50ML
1000 INJECTION, SOLUTION INTRAVENOUS ONCE
Status: COMPLETED | OUTPATIENT
Start: 2025-04-25 | End: 2025-04-25

## 2025-04-25 RX ADMIN — ENOXAPARIN SODIUM 105 MG: 150 INJECTION SUBCUTANEOUS at 21:32

## 2025-04-25 RX ADMIN — KETOROLAC TROMETHAMINE 15 MG: 30 INJECTION, SOLUTION INTRAMUSCULAR; INTRAVENOUS at 21:24

## 2025-04-25 RX ADMIN — CEFTRIAXONE 1000 MG: 1 INJECTION, SOLUTION INTRAVENOUS at 21:24

## 2025-04-25 RX ADMIN — ACETAMINOPHEN 975 MG: 325 TABLET, FILM COATED ORAL at 21:24

## 2025-04-25 RX ADMIN — SODIUM CHLORIDE 1000 ML: 0.9 INJECTION, SOLUTION INTRAVENOUS at 21:30

## 2025-04-25 NOTE — PROGRESS NOTES
[facility]        NAME: Darrius Mederos is a 55 y.o. male  : 1970    MRN: [id number]  DATE: 2025  TIME: 7:47 PM    Assessment and Plan   Pain of left calf [M79.662]  1. Pain of left calf  Transfer to other facility      2. Varicose veins of left lower extremity with pain        3. Acute pain of left knee              Patient Instructions   Patient with left lower extremity pain, worrisome for DVT.  I recommended the patient to proceed to the emergency department for further evaluation and management to rule out DVT.  Patient expressed understanding and is in agreement with this plan at this time.  He will be transported to the ED by his brother who is driving.    Follow up with PCP in 3-5 days.  Proceed to  ER if symptoms worsen.    If tests have been performed at [facility], our office will contact you with results if changes need to be made to the care plan discussed with you at the visit.  You can review your full results on [facility] MyChart.    Chief Complaint     Chief Complaint   Patient presents with    Knee Pain     Pt presents with swelling and pain in the left knee.           History of Present Illness       55-year-old male presenting with left left knee pain and ankle pain for the past 1 week.  He reports today beginning with a fever and feeling increasingly short of breath.  His left knee pain has become increasingly swollen today and he is now having difficulty with bending his knee and weightbearing.    Knee Pain         Review of Systems   Review of Systems   Constitutional: Negative.    HENT: Negative.     Eyes: Negative.    Respiratory: Negative.     Cardiovascular: Negative.    Gastrointestinal: Negative.    Genitourinary: Negative.    Musculoskeletal:  Positive for arthralgias, joint swelling and myalgias.   Skin: Negative.    Allergic/Immunologic: Negative.    Neurological: Negative.    Hematological: Negative.    Psychiatric/Behavioral: Negative.           Current  Medications       Current Outpatient Medications:     ibuprofen (MOTRIN) 200 mg tablet, Take by mouth every 6 (six) hours as needed for mild pain, Disp: , Rfl:     meloxicam (MOBIC) 7.5 mg tablet, Take 1 tablet (7.5 mg total) by mouth daily (Patient not taking: Reported on 9/5/2022), Disp: 60 tablet, Rfl: 0    methylPREDNISolone 4 MG tablet therapy pack, Use as directed on package, Disp: 21 tablet, Rfl: 0    Current Allergies     Allergies as of 04/25/2025    (Not on File)            The following portions of the patient's history were reviewed and updated as appropriate: allergies, current medications, past family history, past medical history, past social history, past surgical history and problem list.     No past medical history on file.    No past surgical history on file.    No family history on file.      Medications have been verified.        Objective   Pulse (!) 145   Temp (!) 102 °F (38.9 °C)   Resp (!) 24   SpO2 98%   No LMP for male patient.       Physical Exam     Physical Exam  Constitutional:       Appearance: He is well-developed. He is ill-appearing and diaphoretic.   HENT:      Head: Normocephalic.      Nose: Nose normal.   Eyes:      Pupils: Pupils are equal, round, and reactive to light.   Cardiovascular:      Rate and Rhythm: Regular rhythm. Tachycardia present.   Pulmonary:      Breath sounds: No wheezing.   Abdominal:      General: Abdomen is flat.   Musculoskeletal:      Cervical back: Normal range of motion.      Left knee: Swelling present. Decreased range of motion.      Left ankle: Swelling present. Tenderness present. Decreased range of motion.   Skin:     General: Skin is warm.   Neurological:      Mental Status: He is alert.

## 2025-04-25 NOTE — Clinical Note
Darrius Harrisonnhpushpa was seen and treated in our emergency department on 4/25/2025.                Diagnosis:     Darrius  .    He may return on this date: 04/28/2025         If you have any questions or concerns, please don't hesitate to call.      Raheel Weiss, DO    ______________________________           _______________          _______________  Hospital Representative                              Date                                Time

## 2025-04-26 NOTE — ED PROVIDER NOTES
Time reflects when diagnosis was documented in both MDM as applicable and the Disposition within this note       Time User Action Codes Description Comment    4/25/2025  9:21 PM Raheel Weiss [M79.89] Leg swelling     4/25/2025  9:22 PM Raheel Weiss [S83.90XA] Knee sprain     4/25/2025  9:22 PM Raheel Weiss [L03.119] Cellulitis of foot           ED Disposition       ED Disposition   Discharge    Condition   Stable    Date/Time   Fri Apr 25, 2025  9:21 PM    Comment   Darrius Mederos discharge to home/self care.                   Assessment & Plan       Medical Decision Making  Cellulitis of left foot. Started on abx. Knee and foot xrays negative. Abx started. Dimer positive, f/u US ordered. Lovenox given.     Amount and/or Complexity of Data Reviewed  Labs:  Decision-making details documented in ED Course.  Radiology: ordered and independent interpretation performed.    Risk  OTC drugs.  Prescription drug management.        ED Course as of 04/25/25 2256 Fri Apr 25, 2025 2055 WBC(!): 13.65   2123 D-Dimer, Quant(!): 1.09  Will give lovenox, US outpatient ordered. Complete abx and d/c for outpatient f/u.   2129 Procedure Note: EKG  Date/Time: 04/25/25 9:29 PM   Performed by: Filiberto Weiss  Authorized by: Filiberto Weiss  ECG interpreted by me, the ED Provider: yes   The EKG demonstrates:  Rate 97  Rhythm sinus  QTc 634  No ST elevations/depressions           Medications   sodium chloride 0.9 % bolus 1,000 mL (0 mL Intravenous Stopped 4/25/25 2239)   ketorolac (TORADOL) injection 15 mg (15 mg Intravenous Given 4/25/25 2124)   acetaminophen (TYLENOL) tablet 975 mg (975 mg Oral Given 4/25/25 2124)   cefTRIAXone (ROCEPHIN) IVPB (premix in dextrose) 1,000 mg 50 mL (0 mg Intravenous Stopped 4/25/25 2156)   enoxaparin (LOVENOX) subcutaneous injection 105 mg (105 mg Subcutaneous Given 4/25/25 2132)       ED Risk Strat Scores                    No data recorded        SBIRT 20yo+      Flowsheet Row  Most Recent Value   Initial Alcohol Screen: US AUDIT-C     1. How often do you have a drink containing alcohol? 0 Filed at: 04/25/2025 2012   2. How many drinks containing alcohol do you have on a typical day you are drinking?  0 Filed at: 04/25/2025 2012   3a. Male UNDER 65: How often do you have five or more drinks on one occasion? 0 Filed at: 04/25/2025 2012   3b. FEMALE Any Age, or MALE 65+: How often do you have 4 or more drinks on one occassion? 0 Filed at: 04/25/2025 2012   Audit-C Score 0 Filed at: 04/25/2025 2012   JOAN: How many times in the past year have you...    Used an illegal drug or used a prescription medication for non-medical reasons? Never Filed at: 04/25/2025 2012                            History of Present Illness       Chief Complaint   Patient presents with    Knee Swelling     Pt reports swelling and pain to left knee. States he had injured his right leg two weeks ago and now has pain. Came from  to rule out blood clot. Redness and swelling to left foot.        History reviewed. No pertinent past medical history.   History reviewed. No pertinent surgical history.   History reviewed. No pertinent family history.   Social History     Tobacco Use    Smoking status: Never    Smokeless tobacco: Never   Vaping Use    Vaping status: Never Used   Substance Use Topics    Alcohol use: Yes    Drug use: Never      E-Cigarette/Vaping    E-Cigarette Use Never User       E-Cigarette/Vaping Substances    Nicotine No     THC No     CBD No     Flavoring No     Other No     Unknown No       I have reviewed and agree with the history as documented.     55-year-old male presenting for left knee swelling and pain as well as left foot redness and warmth.  Was seen at urgent care, sent to ED for evaluation of DVT.  Head injury to the right leg which is improved however he was bearing tons of weight on his left leg.  Not having any swelling think she has had a meniscal injury in the past so this never been  confirmed.  States she had a fever at the urgent care.          Review of Systems   Constitutional: Negative.  Negative for chills and fever.   HENT: Negative.  Negative for rhinorrhea, sore throat, trouble swallowing and voice change.    Eyes: Negative.  Negative for pain and visual disturbance.   Respiratory: Negative.  Negative for cough, shortness of breath and wheezing.    Cardiovascular: Negative.  Negative for chest pain and palpitations.   Gastrointestinal:  Negative for abdominal pain, diarrhea, nausea and vomiting.   Genitourinary: Negative.  Negative for dysuria and frequency.   Musculoskeletal:  Positive for arthralgias and joint swelling. Negative for neck pain and neck stiffness.   Skin: Negative.  Negative for rash.   Neurological: Negative.  Negative for dizziness, speech difficulty, weakness, light-headedness and numbness.           Objective       ED Triage Vitals [04/25/25 2009]   Temperature Pulse Blood Pressure Respirations SpO2 Patient Position - Orthostatic VS   98.7 °F (37.1 °C) 80 107/76 20 95 % --      Temp Source Heart Rate Source BP Location FiO2 (%) Pain Score    Temporal -- -- -- 10 - Worst Possible Pain      Vitals      Date and Time Temp Pulse SpO2 Resp BP Pain Score FACES Pain Rating User   04/25/25 2156 -- -- -- -- -- 5 --    04/25/25 2124 -- -- -- -- -- 10 - Worst Possible Pain --    04/25/25 2009 98.7 °F (37.1 °C) 80 95 % 20 107/76 10 - Worst Possible Pain -- TH            Physical Exam  Vitals and nursing note reviewed.   Constitutional:       General: He is not in acute distress.     Appearance: He is well-developed.   HENT:      Head: Normocephalic and atraumatic.   Eyes:      Conjunctiva/sclera: Conjunctivae normal.      Pupils: Pupils are equal, round, and reactive to light.   Neck:      Trachea: No tracheal deviation.   Cardiovascular:      Rate and Rhythm: Normal rate and regular rhythm.   Pulmonary:      Effort: Pulmonary effort is normal. No respiratory distress.       Breath sounds: Normal breath sounds. No wheezing or rales.   Abdominal:      General: Bowel sounds are normal. There is no distension.      Palpations: Abdomen is soft.      Tenderness: There is no abdominal tenderness. There is no guarding or rebound.   Musculoskeletal:         General: No deformity.      Cervical back: Normal range of motion and neck supple.      Right knee: Normal.      Left knee: Effusion present. No bony tenderness or crepitus. Decreased range of motion. Tenderness present. Normal alignment.        Feet:    Skin:     General: Skin is warm and dry.      Capillary Refill: Capillary refill takes less than 2 seconds.      Findings: No rash.   Neurological:      Mental Status: He is alert and oriented to person, place, and time.   Psychiatric:         Behavior: Behavior normal.         Results Reviewed       Procedure Component Value Units Date/Time    D-dimer, quantitative [[id number]]  (Abnormal) Collected: 04/25/25 2034    Lab Status: Final result Specimen: Blood from Arm, Right Updated: 04/25/25 2056     D-Dimer, Quant 1.09 ug/ml FEU     Narrative:      In the evaluation for possible pulmonary embolism, in the appropriate (Well's Score of 4 or less) patient, the age adjusted d-dimer cutoff for this patient can be calculated as:    Age x 0.01 (in ug/mL) for Age-adjusted D-dimer exclusion threshold for a patient over 50 years.    Comprehensive metabolic panel [[id number]]  (Abnormal) Collected: 04/25/25 2034    Lab Status: Final result Specimen: Blood from Arm, Right Updated: 04/25/25 2054     Sodium 132 mmol/L      Potassium 3.8 mmol/L      Chloride 98 mmol/L      CO2 21 mmol/L      ANION GAP 13 mmol/L      BUN 10 mg/dL      Creatinine 0.69 mg/dL      Glucose 126 mg/dL      Calcium 9.9 mg/dL      AST 65 U/L      ALT 49 U/L      Alkaline Phosphatase 96 U/L      Total Protein 9.2 g/dL      Albumin 4.3 g/dL      Total Bilirubin 0.58 mg/dL      eGFR 106 ml/min/1.73sq m     Narrative:      National  Kidney Disease Foundation guidelines for Chronic Kidney Disease (CKD):     Stage 1 with normal or high GFR (GFR > 90 mL/min/1.73 square meters)    Stage 2 Mild CKD (GFR = 60-89 mL/min/1.73 square meters)    Stage 3A Moderate CKD (GFR = 45-59 mL/min/1.73 square meters)    Stage 3B Moderate CKD (GFR = 30-44 mL/min/1.73 square meters)    Stage 4 Severe CKD (GFR = 15-29 mL/min/1.73 square meters)    Stage 5 End Stage CKD (GFR <15 mL/min/1.73 square meters)  Note: GFR calculation is accurate only with a steady state creatinine    CBC and differential [[id number]]  (Abnormal) Collected: 04/25/25 2034    Lab Status: Final result Specimen: Blood from Arm, Right Updated: 04/25/25 2040     WBC 13.65 Thousand/uL      RBC 4.87 Million/uL      Hemoglobin 15.4 g/dL      Hematocrit 45.0 %      MCV 92 fL      MCH 31.6 pg      MCHC 34.2 g/dL      RDW 11.1 %      MPV 8.5 fL      Platelets 311 Thousands/uL      nRBC 0 /100 WBCs      Segmented % 59 %      Immature Grans % 1 %      Lymphocytes % 26 %      Monocytes % 11 %      Eosinophils Relative 2 %      Basophils Relative 1 %      Absolute Neutrophils 8.09 Thousands/µL      Absolute Immature Grans 0.12 Thousand/uL      Absolute Lymphocytes 3.58 Thousands/µL      Absolute Monocytes 1.51 Thousand/µL      Eosinophils Absolute 0.23 Thousand/µL      Basophils Absolute 0.12 Thousands/µL             XR knee 4+ vw left injury   ED Interpretation by Raheel Weiss DO (04/25 2116)   No acute fracture noted      XR foot 3+ views LEFT   ED Interpretation by Raheel Weiss DO (04/25 2117)   No acute fracture or dislocation appreciated.      VAS Lower extremity venous insufficiency duplex, Single leg w/ measurements    (Results Pending)       Procedures    ED Medication and Procedure Management   Prior to Admission Medications   Prescriptions Last Dose Informant Patient Reported? Taking?   ibuprofen (MOTRIN) 200 mg tablet   Yes No   Sig: Take by mouth every 6 (six) hours as needed for mild  pain   meloxicam (MOBIC) 7.5 mg tablet   No No   Sig: Take 1 tablet (7.5 mg total) by mouth daily   Patient not taking: Reported on 9/5/2022   methylPREDNISolone 4 MG tablet therapy pack   No No   Sig: Use as directed on package      Facility-Administered Medications: None     Discharge Medication List as of 4/25/2025 10:24 PM        START taking these medications    Details   sulfamethoxazole-trimethoprim (BACTRIM DS) 800-160 mg per tablet Take 1 tablet by mouth 2 (two) times a day for 7 days smx-tmp DS (BACTRIM) 800-160 mg tabs (1tab q12 D10), Starting Fri 4/25/2025, Until Fri 5/2/2025, Normal           CONTINUE these medications which have NOT CHANGED    Details   ibuprofen (MOTRIN) 200 mg tablet Take by mouth every 6 (six) hours as needed for mild pain, Historical Med      meloxicam (MOBIC) 7.5 mg tablet Take 1 tablet (7.5 mg total) by mouth daily, Starting Fri 12/11/2020, Normal      methylPREDNISolone 4 MG tablet therapy pack Use as directed on package, Normal           Outpatient Discharge Orders   VAS Lower extremity venous insufficiency duplex, Single leg w/ measurements   Standing Status: Future Standing Exp. Date: 04/25/29     ED SEPSIS DOCUMENTATION   Time reflects when diagnosis was documented in both MDM as applicable and the Disposition within this note       Time User Action Codes Description Comment    4/25/2025  9:21 PM Raheel Weiss [M79.89] Leg swelling     4/25/2025  9:22 PM Raheel Weiss [S83.90XA] Knee sprain     4/25/2025  9:22 PM Raheel Weiss [L03.119] Cellulitis of foot                  Raheel Weiss DO  04/25/25 2250

## 2025-04-28 LAB
ATRIAL RATE: 97 BPM
P AXIS: 50 DEGREES
PR INTERVAL: 190 MS
QRS AXIS: -16 DEGREES
QRSD INTERVAL: 88 MS
QT INTERVAL: 342 MS
QTC INTERVAL: 434 MS
T WAVE AXIS: 27 DEGREES
VENTRICULAR RATE: 97 BPM

## 2025-04-28 PROCEDURE — 93010 ELECTROCARDIOGRAM REPORT: CPT | Performed by: INTERNAL MEDICINE

## 2025-04-30 ENCOUNTER — RESULTS FOLLOW-UP (OUTPATIENT)
Dept: FAMILY MEDICINE CLINIC | Facility: HOSPITAL | Age: 55
End: 2025-04-30

## 2025-04-30 ENCOUNTER — HOSPITAL ENCOUNTER (OUTPATIENT)
Dept: NON INVASIVE DIAGNOSTICS | Facility: HOSPITAL | Age: 55
Discharge: HOME | End: 2025-04-30
Attending: EMERGENCY MEDICINE
Payer: COMMERCIAL

## 2025-04-30 DIAGNOSIS — M79.89 LEG SWELLING: ICD-10-CM

## 2025-04-30 PROCEDURE — 93971 EXTREMITY STUDY: CPT | Performed by: SURGERY

## 2025-04-30 PROCEDURE — 93971 EXTREMITY STUDY: CPT

## 2025-05-23 ENCOUNTER — OFFICE VISIT (OUTPATIENT)
Dept: FAMILY MEDICINE CLINIC | Facility: HOSPITAL | Age: 55
End: 2025-05-23
Payer: COMMERCIAL

## 2025-05-23 VITALS
HEIGHT: 69 IN | SYSTOLIC BLOOD PRESSURE: 136 MMHG | HEART RATE: 88 BPM | BODY MASS INDEX: 37.8 KG/M2 | DIASTOLIC BLOOD PRESSURE: 74 MMHG | TEMPERATURE: 98 F | WEIGHT: 255.2 LBS

## 2025-05-23 DIAGNOSIS — Z11.4 SCREENING FOR HIV (HUMAN IMMUNODEFICIENCY VIRUS): ICD-10-CM

## 2025-05-23 DIAGNOSIS — E66.812 CLASS 2 OBESITY WITHOUT SERIOUS COMORBIDITY WITH BODY MASS INDEX (BMI) OF 37.0 TO 37.9 IN ADULT, UNSPECIFIED OBESITY TYPE: ICD-10-CM

## 2025-05-23 DIAGNOSIS — Z12.11 SCREEN FOR COLON CANCER: ICD-10-CM

## 2025-05-23 DIAGNOSIS — M79.89 SWELLING OF LEFT HAND: ICD-10-CM

## 2025-05-23 DIAGNOSIS — Z12.5 SCREENING FOR PROSTATE CANCER: ICD-10-CM

## 2025-05-23 DIAGNOSIS — Z11.59 NEED FOR HEPATITIS C SCREENING TEST: ICD-10-CM

## 2025-05-23 DIAGNOSIS — Z00.00 ANNUAL PHYSICAL EXAM: ICD-10-CM

## 2025-05-23 DIAGNOSIS — M79.672 LEFT FOOT PAIN: Primary | ICD-10-CM

## 2025-05-23 PROBLEM — M25.562 LEFT KNEE PAIN: Status: RESOLVED | Noted: 2020-12-11 | Resolved: 2025-05-23

## 2025-05-23 PROBLEM — M79.662 PAIN OF LEFT CALF: Status: RESOLVED | Noted: 2025-04-25 | Resolved: 2025-05-23

## 2025-05-23 PROCEDURE — 99203 OFFICE O/P NEW LOW 30 MIN: CPT | Performed by: NURSE PRACTITIONER

## 2025-05-23 PROCEDURE — 99396 PREV VISIT EST AGE 40-64: CPT | Performed by: NURSE PRACTITIONER

## 2025-05-23 RX ORDER — MELOXICAM 15 MG/1
15 TABLET ORAL DAILY
Qty: 30 TABLET | Refills: 0 | Status: SHIPPED | OUTPATIENT
Start: 2025-05-23

## 2025-05-23 NOTE — PATIENT INSTRUCTIONS
"Patient Education     Routine physical for adults   The Basics   Written by the doctors and editors at [facility]   What is a physical? -- A physical is a routine visit, or \"check-up,\" with your doctor. You might also hear it called a \"wellness visit\" or \"preventive visit.\"  During each visit, the doctor will:   Ask about your physical and mental health   Ask about your habits, behaviors, and lifestyle   Do an exam   Give you vaccines if needed   Talk to you about any medicines you take   Give advice about your health   Answer your questions  Getting regular check-ups is an important part of taking care of your health. It can help your doctor find and treat any problems you have. But it's also important for preventing health problems.  A routine physical is different from a \"sick visit.\" A sick visit is when you see a doctor because of a health concern or problem. Since physicals are scheduled ahead of time, you can think about what you want to ask the doctor.  How often should I get a physical? -- It depends on your age and health. In general, for people age 21 years and older:   If you are younger than 50 years, you might be able to get a physical every 3 years.   If you are 50 years or older, your doctor might recommend a physical every year.  If you have an ongoing health condition, like diabetes or high blood pressure, your doctor will probably want to see you more often.  What happens during a physical? -- In general, each visit will include:   Physical exam - The doctor or nurse will check your height, weight, heart rate, and blood pressure. They will also look at your eyes and ears. They will ask about how you are feeling and whether you have any symptoms that bother you.   Medicines - It's a good idea to bring a list of all the medicines you take to each doctor visit. Your doctor will talk to you about your medicines and answer any questions. Tell them if you are having any side effects that bother you. You " "should also tell them if you are having trouble paying for any of your medicines.   Habits and behaviors - This includes:   Your diet   Your exercise habits   Whether you smoke, drink alcohol, or use drugs   Whether you are sexually active   Whether you feel safe at home  Your doctor will talk to you about things you can do to improve your health and lower your risk of health problems. They will also offer help and support. For example, if you want to quit smoking, they can give you advice and might prescribe medicines. If you want to improve your diet or get more physical activity, they can help you with this, too.   Lab tests, if needed - The tests you get will depend on your age and situation. For example, your doctor might want to check your:   Cholesterol   Blood sugar   Iron level   Vaccines - The recommended vaccines will depend on your age, health, and what vaccines you already had. Vaccines are very important because they can prevent certain serious or deadly infections.   Discussion of screening - \"Screening\" means checking for diseases or other health problems before they cause symptoms. Your doctor can recommend screening based on your age, risk, and preferences. This might include tests to check for:   Cancer, such as breast, prostate, cervical, ovarian, colorectal, prostate, lung, or skin cancer   Sexually transmitted infections, such as chlamydia and gonorrhea   Mental health conditions like depression and anxiety  Your doctor will talk to you about the different types of screening tests. They can help you decide which screenings to have. They can also explain what the results might mean.   Answering questions - The physical is a good time to ask the doctor or nurse questions about your health. If needed, they can refer you to other doctors or specialists, too.  Adults older than 65 years often need other care, too. As you get older, your doctor will talk to you about:   How to prevent falling at " home   Hearing or vision tests   Memory testing   How to take your medicines safely   Making sure that you have the help and support you need at home  All topics are updated as new evidence becomes available and our peer review process is complete.  This topic retrieved from [employer] on: May 02, 2024.  Topic 837974 Version 1.0  Release: 32.4.3 - C32.122  © 2024 UpToDate, Inc. and/or its affiliates. All rights reserved.  Consumer Information Use and Disclaimer   Disclaimer: This generalized information is a limited summary of diagnosis, treatment, and/or medication information. It is not meant to be comprehensive and should be used as a tool to help the user understand and/or assess potential diagnostic and treatment options. It does NOT include all information about conditions, treatments, medications, side effects, or risks that may apply to a specific patient. It is not intended to be medical advice or a substitute for the medical advice, diagnosis, or treatment of a health care provider based on the health care provider's examination and assessment of a patient's specific and unique circumstances. Patients must speak with a health care provider for complete information about their health, medical questions, and treatment options, including any risks or benefits regarding use of medications. This information does not endorse any treatments or medications as safe, effective, or approved for treating a specific patient. UpToDate, Inc. and its affiliates disclaim any warranty or liability relating to this information or the use thereof.The use of this information is governed by the Terms of Use, available at https://www.woltersWhoisEDIuwer.com/en/know/clinical-effectiveness-terms. 2024© UpToDate, Inc. and its affiliates and/or licensors. All rights reserved.  Copyright   © 2024 UpToDate, Inc. and/or its affiliates. All rights reserved.

## 2025-05-23 NOTE — PROGRESS NOTES
Adult Annual Physical  Name: Darrius Mederos      : 1970      MRN: [id number]  Encounter Provider: EMELYN Torre  Encounter Date: 2025   Encounter department: [facility] [address]     :  Assessment & Plan  Left foot pain  Likely multifactorial given calcaneal bone spur and ankle arthritis noted on ED xray  Consult placed to establish with foot/ankle specialist    Orders:    Ambulatory Referral to Orthopedic Surgery; Future    Swelling of left hand  Evaluate further w/xray as ordered & use meloxicam as rx'd  May need ortho and/or OT consult if sx's persist/worsen    Orders:    XR hand 3+ vw left; Future    meloxicam (MOBIC) 15 mg tablet; Take 1 tablet (15 mg total) by mouth daily    Class 2 obesity without serious comorbidity with body mass index (BMI) of 37.0 to 37.9 in adult, unspecified obesity type  Healthy diet and regular exercise efforts encouraged  Update labs as ordered    Orders:    CBC and differential; Future    Comprehensive metabolic panel; Future    TSH, 3rd generation with Free T4 reflex; Future    Lipid Panel with Direct LDL reflex; Future    Hemoglobin A1C; Future    Annual physical exam  PE updated, return in 1 year for next       Screen for colon cancer    Orders:    Ambulatory Referral to Gastroenterology; Future    Screening for HIV (human immunodeficiency virus)    Orders:    HIV 1/2 AG/AB w Reflex [facility] for 2 yr old and above; Future    Need for hepatitis C screening test    Orders:    Hepatitis C antibody; Future    Screening for prostate cancer    Orders:    PSA, Total Screen; Future      Preventive Screenings:  - Diabetes Screening: screening up-to-date  - Cholesterol Screening: risks/benefits discussed and orders placed   - Hepatitis C screening: orders placed   - HIV screening: orders placed   - Colon cancer screening: orders placed   - Lung cancer screening: screening not indicated   - Prostate cancer screening: orders placed      Immunizations:  - Immunizations due: Tdap and Zoster (Shingrix)    Counseling/Anticipatory Guidance:  - Alcohol: discussed moderation in alcohol intake and recommendations for healthy alcohol use.   - Dental health: discussed importance of regular tooth brushing, flossing, and dental visits.   - Diet: discussed recommendations for a healthy/well-balanced diet.   - Exercise: the importance of regular exercise/physical activity was discussed. Recommend exercise 3-5 times per week for at least 30 minutes.       Depression Screening and Follow-up Plan: Patient was screened for depression during today's encounter. They screened negative with a PHQ-2 score of 0.          History of Present Illness         Adult Annual Physical:  Patient presents for annual physical. New patient here to get established. Was recently in ED with pain and swelling of left foot. Blood clot ruled out w/normal doppler on 4/30. Was told it might be a cellulitis but not treated with antibiotics.       PMH: none  PSH: none  Meds: none  Social: 6-10 beers daily (attempting to decrease), denies smoking & drug use  .     Diet and Physical Activity:  - Diet/Nutrition: no special diet.  - Exercise: no formal exercise.    Depression Screening:  - PHQ-2 Score: 0    General Health:  - Sleep: 4-6 hours of sleep on average and unrefreshing sleep.  - Hearing: normal hearing bilateral ears.  - Vision: most recent eye exam > 1 year ago.  - Dental: no dental visits for > 1 year.     Health:    - Urinary symptoms: none.         Review of Systems   Constitutional: Negative.    HENT: Negative.     Respiratory: Negative.     Cardiovascular: Negative.    Gastrointestinal: Negative.    Genitourinary: Negative.    Musculoskeletal:  Positive for arthralgias (left foot s/p recent eval in ED; left hand and wrist pain x1 week since 5th finger injury).   Neurological: Negative.    Psychiatric/Behavioral:  Negative for dysphoric mood.          Objective   /74 (BP  "Location: Left arm, Patient Position: Sitting, Cuff Size: Large)   Pulse 88   Temp 98 °F (36.7 °C)   Ht 5' 9\" (1.753 m)   Wt 116 kg (255 lb 3.2 oz)   BMI 37.69 kg/m²         Physical Exam  Vitals reviewed.   Constitutional:       General: He is not in acute distress.     Appearance: Normal appearance. He is obese.   HENT:      Head: Normocephalic.      Right Ear: Tympanic membrane normal.      Left Ear: Tympanic membrane normal.      Nose: Nose normal.      Mouth/Throat:      Mouth: Mucous membranes are moist.      Pharynx: Oropharynx is clear.     Eyes:      General: No scleral icterus.        Right eye: No discharge.         Left eye: No discharge.     Neck:      Thyroid: No thyromegaly.     Cardiovascular:      Rate and Rhythm: Normal rate and regular rhythm.   Pulmonary:      Effort: Pulmonary effort is normal. No respiratory distress.      Breath sounds: Normal breath sounds.   Abdominal:      General: Abdomen is protuberant.      Palpations: Abdomen is soft.      Tenderness: There is no abdominal tenderness. There is no guarding or rebound.     Musculoskeletal:      Left hand: Swelling (dorsum of hand) and tenderness present. Normal range of motion.      Cervical back: Normal range of motion.      Right lower leg: No edema.      Left lower leg: No edema.      Left foot: Normal range of motion. Swelling (dorsum of foot) present. No deformity or tenderness.   Lymphadenopathy:      Cervical: No cervical adenopathy.     Skin:     General: Skin is warm and dry.     Neurological:      General: No focal deficit present.      Mental Status: He is alert and oriented to person, place, and time.      Cranial Nerves: No cranial nerve deficit.      Motor: No weakness.      Gait: Gait normal.     Psychiatric:         Mood and Affect: Mood normal.         Behavior: Behavior normal.         Thought Content: Thought content normal.         Judgment: Judgment normal.         Administrative Statements   I have spent a total " time of 30 minutes in caring for this patient on the day of the visit/encounter including Diagnostic results, Instructions for management, Patient and family education, Risk factor reductions, Impressions, Counseling / Coordination of care, Documenting in the medical record, Reviewing/placing orders in the medical record (including tests, medications, and/or procedures), and Obtaining or reviewing history  .

## 2025-05-29 LAB
ALBUMIN SERPL-MCNC: 4.2 G/DL (ref 3.8–4.9)
ALP SERPL-CCNC: 123 IU/L (ref 44–121)
ALT SERPL-CCNC: 45 IU/L (ref 0–44)
AST SERPL-CCNC: 53 IU/L (ref 0–40)
BASOPHILS # BLD AUTO: 0.1 X10E3/UL (ref 0–0.2)
BASOPHILS NFR BLD AUTO: 2 %
BILIRUB SERPL-MCNC: 0.6 MG/DL (ref 0–1.2)
BUN SERPL-MCNC: 7 MG/DL (ref 6–24)
BUN/CREAT SERPL: 10 (ref 9–20)
CALCIUM SERPL-MCNC: 9.7 MG/DL (ref 8.7–10.2)
CHLORIDE SERPL-SCNC: 99 MMOL/L (ref 96–106)
CHOLEST SERPL-MCNC: 179 MG/DL (ref 100–199)
CO2 SERPL-SCNC: 19 MMOL/L (ref 20–29)
CREAT SERPL-MCNC: 0.67 MG/DL (ref 0.76–1.27)
EGFR: 110 ML/MIN/1.73
EOSINOPHIL # BLD AUTO: 0.2 X10E3/UL (ref 0–0.4)
EOSINOPHIL NFR BLD AUTO: 3 %
ERYTHROCYTE [DISTWIDTH] IN BLOOD BY AUTOMATED COUNT: 12.1 % (ref 11.6–15.4)
GLOBULIN SER-MCNC: 3.6 G/DL (ref 1.5–4.5)
GLUCOSE SERPL-MCNC: 110 MG/DL (ref 70–99)
HBA1C MFR BLD: 5.4 % (ref 4.8–5.6)
HCT VFR BLD AUTO: 46.1 % (ref 37.5–51)
HCV AB S/CO SERPL IA: NON REACTIVE
HDLC SERPL-MCNC: 34 MG/DL
HGB BLD-MCNC: 15.6 G/DL (ref 13–17.7)
HIV 1+2 AB+HIV1 P24 AG SERPL QL IA: NON REACTIVE
IMM GRANULOCYTES # BLD: 0 X10E3/UL (ref 0–0.1)
IMM GRANULOCYTES NFR BLD: 1 %
LDLC SERPL CALC-MCNC: 114 MG/DL (ref 0–99)
LDLC/HDLC SERPL: 3.4 RATIO (ref 0–3.6)
LYMPHOCYTES # BLD AUTO: 3.4 X10E3/UL (ref 0.7–3.1)
LYMPHOCYTES NFR BLD AUTO: 45 %
MCH RBC QN AUTO: 30.1 PG (ref 26.6–33)
MCHC RBC AUTO-ENTMCNC: 33.8 G/DL (ref 31.5–35.7)
MCV RBC AUTO: 89 FL (ref 79–97)
MONOCYTES # BLD AUTO: 0.7 X10E3/UL (ref 0.1–0.9)
MONOCYTES NFR BLD AUTO: 10 %
NEUTROPHILS # BLD AUTO: 2.9 X10E3/UL (ref 1.4–7)
NEUTROPHILS NFR BLD AUTO: 39 %
PLATELET # BLD AUTO: 160 X10E3/UL (ref 150–450)
POTASSIUM SERPL-SCNC: 4.1 MMOL/L (ref 3.5–5.2)
PROT SERPL-MCNC: 7.8 G/DL (ref 6–8.5)
RBC # BLD AUTO: 5.18 X10E6/UL (ref 4.14–5.8)
SL AMB VLDL CHOLESTEROL CALC: 31 MG/DL (ref 5–40)
SODIUM SERPL-SCNC: 137 MMOL/L (ref 134–144)
TRIGL SERPL-MCNC: 172 MG/DL (ref 0–149)
TSH SERPL DL<=0.005 MIU/L-ACNC: 4.29 UIU/ML (ref 0.45–4.5)
WBC # BLD AUTO: 7.4 X10E3/UL (ref 3.4–10.8)

## 2025-05-30 ENCOUNTER — RESULTS FOLLOW-UP (OUTPATIENT)
Dept: FAMILY MEDICINE CLINIC | Facility: HOSPITAL | Age: 55
End: 2025-05-30

## 2025-05-30 DIAGNOSIS — R79.89 ELEVATED LIVER FUNCTION TESTS: Primary | ICD-10-CM

## 2025-06-06 ENCOUNTER — HOSPITAL ENCOUNTER (OUTPATIENT)
Dept: ULTRASOUND IMAGING | Facility: HOSPITAL | Age: 55
End: 2025-06-06
Attending: NURSE PRACTITIONER
Payer: COMMERCIAL

## 2025-06-06 DIAGNOSIS — R79.89 ELEVATED LIVER FUNCTION TESTS: Primary | ICD-10-CM

## 2025-06-06 DIAGNOSIS — R79.89 ELEVATED LIVER FUNCTION TESTS: ICD-10-CM

## 2025-06-06 PROCEDURE — 76705 ECHO EXAM OF ABDOMEN: CPT

## 2025-06-07 ENCOUNTER — APPOINTMENT (OUTPATIENT)
Dept: RADIOLOGY | Facility: CLINIC | Age: 55
End: 2025-06-07
Payer: COMMERCIAL

## 2025-06-07 ENCOUNTER — OFFICE VISIT (OUTPATIENT)
Dept: OBGYN CLINIC | Facility: CLINIC | Age: 55
End: 2025-06-07
Attending: NURSE PRACTITIONER
Payer: COMMERCIAL

## 2025-06-07 VITALS — BODY MASS INDEX: 37.77 KG/M2 | HEIGHT: 69 IN | WEIGHT: 255 LBS

## 2025-06-07 DIAGNOSIS — M13.0 POLYARTHRITIS: Primary | ICD-10-CM

## 2025-06-07 DIAGNOSIS — M19.072 ARTHRITIS OF LEFT FOOT: ICD-10-CM

## 2025-06-07 DIAGNOSIS — M25.649 STIFFNESS OF HAND JOINT, UNSPECIFIED LATERALITY: ICD-10-CM

## 2025-06-07 DIAGNOSIS — M13.0 POLYARTHRITIS: ICD-10-CM

## 2025-06-07 DIAGNOSIS — M19.072 ARTHRITIS OF LEFT ANKLE: ICD-10-CM

## 2025-06-07 PROCEDURE — 99214 OFFICE O/P EST MOD 30 MIN: CPT | Performed by: FAMILY MEDICINE

## 2025-06-07 PROCEDURE — 73130 X-RAY EXAM OF HAND: CPT

## 2025-06-07 NOTE — ASSESSMENT & PLAN NOTE
Bilateral atraumatic hand swelling and stiffness concerning for autoimmune disorder  Recommended laboratory investigation follow-up rheumatology  Patient continue with meloxicam monitored by his primary care provider      Orders:  •  Sedimentation rate, automated  •  C-reactive protein; Future  •  Uric acid; Future  •  BERTO Screen w/Reflex Cascade; Future  •  RHEUMATOID FACTOR; Future  •  Lyme Total AB W Reflex to IGM/IGG; Future  •  Cyclic citrul peptide antibody, IgG  •  Ambulatory Referral to Rheumatology; Future  •  XR hand 3+ vw left; Future  •  XR hand 3+ vw right; Future

## 2025-06-07 NOTE — PROGRESS NOTES
Name: Darrius Mederos      : 1970      MRN: [id number]  Encounter Provider: Chirag Lazar III, DO  Encounter Date: 2025   Encounter department: [facility]  :  Assessment & Plan  Polyarthritis    Bilateral atraumatic hand swelling and stiffness concerning for autoimmune disorder  Recommended laboratory investigation follow-up rheumatology  Patient continue with meloxicam monitored by his primary care provider      Orders:  •  Sedimentation rate, automated  •  C-reactive protein; Future  •  Uric acid; Future  •  BERTO Screen w/Reflex Cascade; Future  •  RHEUMATOID FACTOR; Future  •  Lyme Total AB W Reflex to IGM/IGG; Future  •  Cyclic citrul peptide antibody, IgG  •  Ambulatory Referral to Rheumatology; Future  •  XR hand 3+ vw left; Future  •  XR hand 3+ vw right; Future    Stiffness of hand joint, unspecified laterality         Arthritis of left ankle  Currently significant improved.  Will avoid corticosteroid injection at this time         Arthritis of left foot             History of Present Illness   HPI  Darrius Mederos is a 55 y.o. male who presents   For evaluation of left foot pain approximate 1 month.  Review of recent ER note reveals that patient had injured his right legs and then developed pain in the opposite leg with redness and swelling to the left foot.  Denies any injury event of the left foot.  He did present to the ER 2025 where he had significant investigation including venous duplex which revealed no evidence of clot despite positive D-dimer.  He was placed on Lovenox but did not have any follow-up oral blood thinner.  He was also started on antibiotic for cellulitis of the left foot during his ER stay with Rocephin IV.     He did have follow-up appoint with his primary care physician on 2025 who referred to orthopedics for evaluation of ankle arthritis and calcaneal bone spur.  He is taking meloxicam.    His pain today is  "currently significant proved approximate 1 out of 10.  He is here seeking guidance for his ankle and foot arthritis.    Patient also complains of left hand swelling and stiffness atraumatic approximately 2 weeks.  He points to the second MCP as well as the pinky as source of pain.  Also points to the dorsal wrist.  Pain is significant improved since initial 2 weeks ago.  Overall scale his pain is resolved but he has persistent stiffness in the hand.    Patient tells me he has a family history of gout in her brother.    Review of Systems       Objective   Ht 5' 9\" (1.753 m)   Wt 116 kg (255 lb)   BMI 37.66 kg/m²      Physical Exam    LEFT ANKLE  EXAM  Inspection  Erythema: no  Ecchymosis: no  Edema:  none    Tenderness  Proximal Fibula: no  (Maisonneuve frx)  AiTFL: no  (2cm proximal-medial to tip lateral malleolus 92% sens, 29% spec)  ATFL: no  CFL: no  PTFL: no  Achilles:  no  Deltoid: No  Peroneal: no  Tibialis Anterior: no  Tibialis Posterior: no    Bony Tenderpoints:  Lateral Malleolus: no  Base of 5th MT: no  Medial Malleolus: no  Navicular: no  Talar dome: No    ROM  Dorsiflexion: intact  Plantarflexion: intact    Muscle Strength  Pronation: intact   Supination: intact     Calcaneal Squeeze: negative    Left foot exam  No swelling, erythema or increased warmth  Tenderness: none  ROM Toes extension: intact  ROM Toes flexion: intact  Strength Toes: 5/5 flex, ext    Left Hand  no erythem  swelling:n  tenderness:n  No severe ROM deficiet  sensation intact  Froment sign:  normal  OK sign:  Normal  Thumb extension:  5/5       Right Hand  no erythem  swelling:n  tenderness:n  No severe ROM deficiet  sensation intact  Froment sign:  normal  OK sign:  Normal  Thumb extension:  5/5     ____________________________________________________________________    Return for Follow-up with specialist.  ____________________________________________________________________    IMAGING STUDIES: (I personally reviewed images in " PACS, and if available-report):   XR FOOT 3+ VW LEFT     INDICATION: redness, swelling.     COMPARISON: None     FINDINGS:     Positioning is suboptimal for evaluation of the toes.  No acute fractures or dislocations are seen.     Joint spaces appear grossly within normal limits within the foot, however, there does appear to be some degenerative arthritis at the ankle. There is a posterior calcaneal bone spur.     No lytic or blastic osseous lesion.     There is soft tissue swelling at the ankle and along the top of the foot. No soft tissue gas or radiopaque foreign body identified. No gross evidence of osteomyelitis     IMPRESSION:     Swelling. Calcaneal bone spur posteriorly. Arthritis at the ankle         PAST REPORTS:    ____________________________________________________________________    Procedures  ____________________________________________________________________    Past Medical History[1]  Past Surgical History[2]  Social History   Social History     Substance and Sexual Activity   Alcohol Use Yes     Social History     Substance and Sexual Activity   Drug Use Never     Tobacco Use History[3]  Family History[4]  Allergies[5]  ____________________________________________________________________    Patient instructions below verbally summarized in person during encounter:  There are no Patient Instructions on file for this visit.                   [1]  Past Medical History:  Diagnosis Date   • Left knee pain 12/11/2020   • Pain of left calf 04/25/2025   [2]  No past surgical history on file.[3]  Social History  Tobacco Use   Smoking Status Never   Smokeless Tobacco Never   [4]  No family history on file.[5]  No Known Allergies

## 2025-06-13 ENCOUNTER — RESULTS FOLLOW-UP (OUTPATIENT)
Dept: FAMILY MEDICINE CLINIC | Facility: HOSPITAL | Age: 55
End: 2025-06-13